# Patient Record
Sex: FEMALE | Race: WHITE | NOT HISPANIC OR LATINO | Employment: OTHER | URBAN - METROPOLITAN AREA
[De-identification: names, ages, dates, MRNs, and addresses within clinical notes are randomized per-mention and may not be internally consistent; named-entity substitution may affect disease eponyms.]

---

## 2018-01-08 ENCOUNTER — TRANSCRIBE ORDERS (OUTPATIENT)
Dept: ADMINISTRATIVE | Facility: HOSPITAL | Age: 63
End: 2018-01-08

## 2018-01-08 DIAGNOSIS — Z12.31 VISIT FOR SCREENING MAMMOGRAM: Primary | ICD-10-CM

## 2018-02-02 ENCOUNTER — APPOINTMENT (OUTPATIENT)
Dept: MAMMOGRAPHY | Facility: HOSPITAL | Age: 63
End: 2018-02-02
Attending: INTERNAL MEDICINE

## 2018-04-05 ENCOUNTER — OFFICE VISIT (OUTPATIENT)
Dept: INTERNAL MEDICINE | Facility: CLINIC | Age: 63
End: 2018-04-05

## 2018-04-05 VITALS
TEMPERATURE: 97 F | SYSTOLIC BLOOD PRESSURE: 138 MMHG | HEIGHT: 64 IN | RESPIRATION RATE: 16 BRPM | WEIGHT: 114 LBS | HEART RATE: 68 BPM | DIASTOLIC BLOOD PRESSURE: 62 MMHG | BODY MASS INDEX: 19.46 KG/M2

## 2018-04-05 DIAGNOSIS — F33.9 RECURRENT MAJOR DEPRESSIVE DISORDER, REMISSION STATUS UNSPECIFIED (HCC): ICD-10-CM

## 2018-04-05 DIAGNOSIS — G43.009 MIGRAINE WITHOUT AURA AND WITHOUT STATUS MIGRAINOSUS, NOT INTRACTABLE: ICD-10-CM

## 2018-04-05 DIAGNOSIS — I49.3 PREMATURE VENTRICULAR BEAT: ICD-10-CM

## 2018-04-05 DIAGNOSIS — R05.9 COUGH: ICD-10-CM

## 2018-04-05 DIAGNOSIS — I34.0 MITRAL VALVE INSUFFICIENCY, UNSPECIFIED ETIOLOGY: ICD-10-CM

## 2018-04-05 DIAGNOSIS — J06.9 ACUTE URI: Primary | ICD-10-CM

## 2018-04-05 DIAGNOSIS — Z13.220 SCREENING FOR LIPID DISORDERS: ICD-10-CM

## 2018-04-05 DIAGNOSIS — Z72.0 DECLINED SMOKING CESSATION: ICD-10-CM

## 2018-04-05 DIAGNOSIS — Z00.00 ENCOUNTER FOR HEALTH MAINTENANCE EXAMINATION: ICD-10-CM

## 2018-04-05 DIAGNOSIS — Z79.82 LONG TERM (CURRENT) USE OF ASPIRIN: ICD-10-CM

## 2018-04-05 LAB
ALBUMIN SERPL-MCNC: 4.4 G/DL (ref 3.2–4.8)
ALBUMIN/GLOB SERPL: 1.6 G/DL (ref 1.5–2.5)
ALP SERPL-CCNC: 91 U/L (ref 25–100)
ALT SERPL W P-5'-P-CCNC: 14 U/L (ref 7–40)
ANION GAP SERPL CALCULATED.3IONS-SCNC: 13 MMOL/L (ref 3–11)
ARTICHOKE IGE QN: 110 MG/DL (ref 0–130)
AST SERPL-CCNC: 20 U/L (ref 0–33)
BASOPHILS # BLD AUTO: 0.04 10*3/MM3 (ref 0–0.2)
BASOPHILS NFR BLD AUTO: 0.4 % (ref 0–1)
BILIRUB BLD-MCNC: NEGATIVE MG/DL
BILIRUB SERPL-MCNC: 0.5 MG/DL (ref 0.3–1.2)
BUN BLD-MCNC: 11 MG/DL (ref 9–23)
BUN/CREAT SERPL: 13.8 (ref 7–25)
CALCIUM SPEC-SCNC: 9.9 MG/DL (ref 8.7–10.4)
CHLORIDE SERPL-SCNC: 96 MMOL/L (ref 99–109)
CHOLEST SERPL-MCNC: 164 MG/DL (ref 0–200)
CLARITY, POC: CLEAR
CO2 SERPL-SCNC: 28 MMOL/L (ref 20–31)
COLOR UR: YELLOW
CREAT BLD-MCNC: 0.8 MG/DL (ref 0.6–1.3)
DEPRECATED RDW RBC AUTO: 46.9 FL (ref 37–54)
EOSINOPHIL # BLD AUTO: 0.06 10*3/MM3 (ref 0–0.3)
EOSINOPHIL NFR BLD AUTO: 0.6 % (ref 0–3)
ERYTHROCYTE [DISTWIDTH] IN BLOOD BY AUTOMATED COUNT: 13.5 % (ref 11.3–14.5)
EXPIRATION DATE: NORMAL
GFR SERPL CREATININE-BSD FRML MDRD: 73 ML/MIN/1.73
GLOBULIN UR ELPH-MCNC: 2.7 GM/DL
GLUCOSE BLD-MCNC: 89 MG/DL (ref 70–100)
GLUCOSE UR STRIP-MCNC: NEGATIVE MG/DL
HCT VFR BLD AUTO: 41.8 % (ref 34.5–44)
HDLC SERPL-MCNC: 53 MG/DL (ref 40–60)
HGB BLD-MCNC: 13.6 G/DL (ref 11.5–15.5)
IMM GRANULOCYTES # BLD: 0.06 10*3/MM3 (ref 0–0.03)
IMM GRANULOCYTES NFR BLD: 0.6 % (ref 0–0.6)
KETONES UR QL: NEGATIVE
LEUKOCYTE EST, POC: NEGATIVE
LYMPHOCYTES # BLD AUTO: 2.22 10*3/MM3 (ref 0.6–4.8)
LYMPHOCYTES NFR BLD AUTO: 23.3 % (ref 24–44)
Lab: NORMAL
MCH RBC QN AUTO: 30.7 PG (ref 27–31)
MCHC RBC AUTO-ENTMCNC: 32.5 G/DL (ref 32–36)
MCV RBC AUTO: 94.4 FL (ref 80–99)
MONOCYTES # BLD AUTO: 0.58 10*3/MM3 (ref 0–1)
MONOCYTES NFR BLD AUTO: 6.1 % (ref 0–12)
NEUTROPHILS # BLD AUTO: 6.58 10*3/MM3 (ref 1.5–8.3)
NEUTROPHILS NFR BLD AUTO: 69 % (ref 41–71)
NITRITE UR-MCNC: NEGATIVE MG/ML
PH UR: 6 [PH] (ref 5–8)
PLATELET # BLD AUTO: 516 10*3/MM3 (ref 150–450)
PMV BLD AUTO: 10.7 FL (ref 6–12)
POTASSIUM BLD-SCNC: 4.6 MMOL/L (ref 3.5–5.5)
PROT SERPL-MCNC: 7.1 G/DL (ref 5.7–8.2)
PROT UR STRIP-MCNC: NEGATIVE MG/DL
RBC # BLD AUTO: 4.43 10*6/MM3 (ref 3.89–5.14)
RBC # UR STRIP: NEGATIVE /UL
SODIUM BLD-SCNC: 137 MMOL/L (ref 132–146)
SP GR UR: 1.01 (ref 1–1.03)
TRIGL SERPL-MCNC: 73 MG/DL (ref 0–150)
UROBILINOGEN UR QL: NORMAL
WBC NRBC COR # BLD: 9.54 10*3/MM3 (ref 3.5–10.8)

## 2018-04-05 PROCEDURE — 93000 ELECTROCARDIOGRAM COMPLETE: CPT | Performed by: PHYSICIAN ASSISTANT

## 2018-04-05 PROCEDURE — 80061 LIPID PANEL: CPT | Performed by: PHYSICIAN ASSISTANT

## 2018-04-05 PROCEDURE — 99204 OFFICE O/P NEW MOD 45 MIN: CPT | Performed by: PHYSICIAN ASSISTANT

## 2018-04-05 PROCEDURE — 80053 COMPREHEN METABOLIC PANEL: CPT | Performed by: PHYSICIAN ASSISTANT

## 2018-04-05 PROCEDURE — 36415 COLL VENOUS BLD VENIPUNCTURE: CPT | Performed by: PHYSICIAN ASSISTANT

## 2018-04-05 PROCEDURE — 81003 URINALYSIS AUTO W/O SCOPE: CPT | Performed by: PHYSICIAN ASSISTANT

## 2018-04-05 PROCEDURE — 85025 COMPLETE CBC W/AUTO DIFF WBC: CPT | Performed by: PHYSICIAN ASSISTANT

## 2018-04-05 RX ORDER — CHOLECALCIFEROL (VITAMIN D3) 125 MCG
1 CAPSULE ORAL DAILY
COMMUNITY
End: 2021-11-29

## 2018-04-05 RX ORDER — ASPIRIN 81 MG/1
81 TABLET ORAL DAILY
COMMUNITY
End: 2019-04-05

## 2018-04-05 RX ORDER — CLOBETASOL PROPIONATE 0.5 MG/G
1 OINTMENT TOPICAL TAKE AS DIRECTED
COMMUNITY
End: 2018-10-05 | Stop reason: SDUPTHER

## 2018-04-05 RX ORDER — AZITHROMYCIN 250 MG/1
TABLET, FILM COATED ORAL
Qty: 6 TABLET | Refills: 0 | Status: SHIPPED | OUTPATIENT
Start: 2018-04-05 | End: 2018-10-05

## 2018-04-05 RX ORDER — BENZONATATE 100 MG/1
100 CAPSULE ORAL 3 TIMES DAILY PRN
Qty: 30 CAPSULE | Refills: 0 | Status: SHIPPED | OUTPATIENT
Start: 2018-04-05 | End: 2019-04-05

## 2018-04-05 NOTE — PROGRESS NOTES
Chief Complaint   Patient presents with   • NEW PATIENT, ESTABLISH CARE   • Cough       Subjective       History of Present Illness     Kim Watson is a 62 y.o. female. Cold symptoms x2 weeks with runny nose, non productive cough but coughs so much that she gags and feels like she might throw up. She describes this as a dry, irritating cough.  Also complains of low grade fever, highest at 101.0 a few days ago, + headache, nasal congestion sneezing,  ear pressure. Patient denies eye drainage, itching or redness, no sore throat or difficulty swallowing. Patient states that she also has a pain on the left side of her chest wrapping around to her back which is muscular in nature, and she believes she pulled a muscle due to coughing.  This happened several days ago and has been improving since that time. Has tried tylenol cold and flu, sunny seltzer, other OTC products including Delsym. Tylenol cold and flu provided some relief, but still coughing even with Delsym.   Current smoker-- less than 1/2 a day. Has cut back since she's been sick.  Is interested in smoking cessation but not at this visit despite smoking cessation counseling. Has quit in the past, longest time was one year with no cigarettes.    PMH:  Depression-- Patient states that her depression is fairly well-controlled.  She is tearful 1-2 days per week, but she explained that this was primarily due to life stressors, including caring for her mother who lives with her.  Patient denies anxiety, thoughts of self-harm, or suicidal ideations.  She has been on medications in the past for her depression, but this has been more than 10 years ago.  She is not interested in prescription management of her depression at this time.  Dr. Cristian Lou-- previously prescribed Topomax for headaches and Rx (unknown name) for depression. Not interested in psychiatry or medications at this time.     Migraines-- not currently taking Rx, sometimes takes Aleve. Has  migraines approx 1/month. S/Sx include difficulty focusing eyes, no photophobia or phonophobia, + nausea but no vomiting. Aleve doesn't seem to help, Excedrin doesn't seem to help. Does not interfere with daily life and not interested in other medications or therapies at this time.  Usually resolve spontaneously within 24-36 hours.    Patient also has mitral valve regurgitation, no problems, no concerns at this time. Is not currently seeing cardiology and not interested in cardiology referral at this time.     Are you currently seeing any other doctors or specialists? No     Are you currently taking any OTC medications or herbal medications? Vitamin D, Malik low dose aspirin 81mg, Clobetasol ointment    Current smoker at 1/2 ppd. 47 years. Smoked 2 ppd for 5 years, 1 ppd for 10 years, 1/2 pack since then. Approximately 35 pack year history.     Sleep: fair, 3-6 hours a night. Feels well rested  Diet: lots of H2O, well-balanced  Exercise: exercises daily, walking, several miles    Most recent colonoscopy: n/a-- FOC in the past (normal).   Most recent mammogram: Feb. 2018 (normal)   Most recent pap smear: 20+ years; partial hysterectomy, no cervix-- not indicated.   First day of last menses: n/a-- menopausal. No bleeding or spotting.   Low dose CT scan for screening for lung cancer: pt states that previous doctor tried to order this given her smoking history, the patients insurance denied this.  Patient states that this was at the beginning of 2018.  We will defer lung cancer screening with low-dose CT until next visit.     Regular dental visits: previously, last visit 2 years ago.   Regular eye exams: previously, last visit 2 years ago. Wears glasses.     Immunizations: Patient states that she believes she has had a pneumonia vaccination in the past, approximately 3-5 years ago.  Patient also states she has had another vaccination, possibly TD, in recent years.  Encouraged patient to try and findings records so we  could update immunizations in our system.    The following portions of the patient's history were reviewed and updated as appropriate: allergies, current medications, past family history, past medical history, past social history, past surgical history and problem list.    Allergies   Allergen Reactions   • Morphine And Related GI Intolerance     SEVERE   • Sulfa Antibiotics GI Intolerance     SEVERE     Social History   Substance Use Topics   • Smoking status: Current Every Day Smoker     Types: Cigarettes     Start date: 1974   • Smokeless tobacco: Not on file   • Alcohol use Not on file     History reviewed. No pertinent surgical history.  Family History   Problem Relation Age of Onset   • Arthritis Mother    • Migraines Mother    • Cancer Father      BLADDER   • Alzheimer's disease Father    • Diabetes Brother    • Migraines Brother          Current Outpatient Prescriptions:   •  aspirin 81 MG EC tablet, Take 81 mg by mouth Daily., Disp: , Rfl:   •  Cholecalciferol (VITAMIN D3) 2000 units tablet, Take 1 tablet by mouth Daily., Disp: , Rfl:   •  clobetasol (TEMOVATE) 0.05 % ointment, Apply 1 application topically Take As Directed., Disp: , Rfl:       Patient Active Problem List   Diagnosis   • Recurrent major depressive disorder   • Migraine without aura and without status migrainosus, not intractable       Review of Systems   Constitutional: Positive for chills, fatigue and fever. Negative for appetite change.   HENT: Positive for congestion, postnasal drip, sinus pressure and sneezing. Negative for ear pain, sore throat and trouble swallowing.    Eyes: Negative for pain, discharge and itching.   Respiratory: Positive for cough. Negative for chest tightness, shortness of breath and wheezing.    Cardiovascular: Negative for chest pain and palpitations.   Gastrointestinal: Negative for abdominal pain, diarrhea, nausea and vomiting.   Musculoskeletal: Negative for arthralgias and myalgias.   Skin: Negative for  rash.   Neurological: Positive for headaches. Negative for dizziness, syncope and confusion.       Objective   Vitals:    04/05/18 1024   BP: 138/62   Pulse: 68   Resp: 16   Temp: 97 °F (36.1 °C)       Physical Exam   Constitutional: She is oriented to person, place, and time. She appears well-developed and well-nourished.   HENT:   Head: Normocephalic and atraumatic.   Right Ear: Tympanic membrane normal. No tenderness. Tympanic membrane is not erythematous and not bulging.   Left Ear: Tympanic membrane normal. No tenderness. Tympanic membrane is not erythematous and not bulging.   Nose: Rhinorrhea and congestion present. Right sinus exhibits maxillary sinus tenderness. Right sinus exhibits no frontal sinus tenderness. Left sinus exhibits maxillary sinus tenderness. Left sinus exhibits no frontal sinus tenderness.   Eyes: Pupils are equal, round, and reactive to light.   Neck: Carotid bruit is not present. No thyroid mass and no thyromegaly present.   Cardiovascular: Normal rate.    premature beat noted before systole, possible PVCs    Pulmonary/Chest: Effort normal and breath sounds normal.   Abdominal: Soft. Bowel sounds are normal. There is no hepatosplenomegaly.   Neurological: She is alert and oriented to person, place, and time.   Psychiatric: Her behavior is normal. Her mood appears anxious. Cognition and memory are normal.       ECG 12 Lead  Date/Time: 4/5/2018 4:19 PM  Performed by: OSCAR ELLINGTON  Authorized by: OSCAR ELLINGTON   Previous ECG: no previous ECG available  Rhythm: sinus rhythm  Rate: normal  Conduction: conduction normal  ST Segments: ST segments normal  Other: no other findings                Assessment/Plan   Kim was seen today for new patient, establish care and cough.    Diagnoses and all orders for this visit:    Acute URI  -     azithromycin (ZITHROMAX Z-LUIS ANTONIO) 250 MG tablet; Take 2 tablets the first day, then 1 tablet daily for 4 days.    Recurrent major depressive disorder,  remission status unspecified    Screening for lipid disorders  -     Lipid Panel    Migraine without aura and without status migrainosus, not intractable  -     Comprehensive Metabolic Panel    Encounter for health maintenance examination  -     Ambulatory Referral For Screening Colonoscopy  -     Comprehensive Metabolic Panel  -     POC Urinalysis Dipstick, Automated  -     CBC & Differential  -     Lipid Panel  -     CBC Auto Differential    Long term (current) use of aspirin  -     CBC & Differential  -     CBC Auto Differential    Cough  -     azithromycin (ZITHROMAX Z-LUIS ANTONIO) 250 MG tablet; Take 2 tablets the first day, then 1 tablet daily for 4 days.  -     benzonatate (TESSALON PERLES) 100 MG capsule; Take 1 capsule by mouth 3 (Three) Times a Day As Needed for Cough.    Declined smoking cessation    Other orders  -     Cancel: CT Chest Low Dose Wo; Future  -     ECG 12 Lead      Immunizations: Encouraged patient to try to find immunization record for that we can update pneumonia, zoster and Tdap records.  Will hold off on vaccinations until next visit; consider pneumonia vaccine <65 years old due to long-term smoking/ high risk patient.       Patient education discussed during this visit:  - avoidance of texting while driving and the need for wearing seatbelt  - use of sunscreen  - healthy sleep habits and appropriate amount of sleep  - H2O consumption, well-balanced diet  - exercise routine which includes at least 150 minutes of cardio per week + muscle strengthening exercises  - immunizations including annual flu vaccination      Return in about 6 months (around 10/5/2018) for Annual physical.

## 2018-04-06 DIAGNOSIS — D75.839 THROMBOCYTOSIS: Primary | ICD-10-CM

## 2018-04-09 ENCOUNTER — TELEPHONE (OUTPATIENT)
Dept: INTERNAL MEDICINE | Facility: CLINIC | Age: 63
End: 2018-04-09

## 2018-04-09 NOTE — TELEPHONE ENCOUNTER
----- Message from Ariana Zepeda PA-C sent at 4/9/2018  9:28 AM EDT -----  Contact: SELF  Please update pt immunization record as indicated.       ----- Message -----  From: Edelmira Lr  Sent: 4/9/2018   9:13 AM  To: Ariana Zepeda PA-C    ROSALIND SARITHA CALLING TO UPDATE YOU ON HER PAST SHOTS. SHE HAS HAD HER TETANUS IN 2004, ZOSTER IN 2015 AND TDAP IN JAN OF 2018.    IF NEEDED ROSALIND CAN BE REACHED [phone]

## 2018-04-10 DIAGNOSIS — Z12.11 SCREENING FOR COLON CANCER: Primary | ICD-10-CM

## 2018-04-10 RX ORDER — SODIUM, POTASSIUM,MAG SULFATES 17.5-3.13G
1 SOLUTION, RECONSTITUTED, ORAL ORAL TAKE AS DIRECTED
Qty: 2 BOTTLE | Refills: 0 | Status: SHIPPED | OUTPATIENT
Start: 2018-04-10 | End: 2019-04-05

## 2018-04-17 ENCOUNTER — OUTSIDE FACILITY SERVICE (OUTPATIENT)
Dept: GASTROENTEROLOGY | Facility: CLINIC | Age: 63
End: 2018-04-17

## 2018-04-17 PROCEDURE — G0121 COLON CA SCRN NOT HI RSK IND: HCPCS | Performed by: INTERNAL MEDICINE

## 2018-04-26 ENCOUNTER — LAB (OUTPATIENT)
Dept: INTERNAL MEDICINE | Facility: CLINIC | Age: 63
End: 2018-04-26

## 2018-04-26 DIAGNOSIS — D75.839 THROMBOCYTOSIS: ICD-10-CM

## 2018-04-26 LAB
BASOPHILS # BLD AUTO: 0.05 10*3/MM3 (ref 0–0.2)
BASOPHILS NFR BLD AUTO: 0.8 % (ref 0–1)
DEPRECATED RDW RBC AUTO: 48 FL (ref 37–54)
EOSINOPHIL # BLD AUTO: 0.13 10*3/MM3 (ref 0–0.3)
EOSINOPHIL NFR BLD AUTO: 2.1 % (ref 0–3)
ERYTHROCYTE [DISTWIDTH] IN BLOOD BY AUTOMATED COUNT: 13.9 % (ref 11.3–14.5)
HCT VFR BLD AUTO: 42 % (ref 34.5–44)
HGB BLD-MCNC: 13.5 G/DL (ref 11.5–15.5)
IMM GRANULOCYTES # BLD: 0.02 10*3/MM3 (ref 0–0.03)
IMM GRANULOCYTES NFR BLD: 0.3 % (ref 0–0.6)
LYMPHOCYTES # BLD AUTO: 2.22 10*3/MM3 (ref 0.6–4.8)
LYMPHOCYTES NFR BLD AUTO: 35.3 % (ref 24–44)
MCH RBC QN AUTO: 30.1 PG (ref 27–31)
MCHC RBC AUTO-ENTMCNC: 32.1 G/DL (ref 32–36)
MCV RBC AUTO: 93.5 FL (ref 80–99)
MONOCYTES # BLD AUTO: 0.53 10*3/MM3 (ref 0–1)
MONOCYTES NFR BLD AUTO: 8.4 % (ref 0–12)
NEUTROPHILS # BLD AUTO: 3.36 10*3/MM3 (ref 1.5–8.3)
NEUTROPHILS NFR BLD AUTO: 53.4 % (ref 41–71)
PLATELET # BLD AUTO: 285 10*3/MM3 (ref 150–450)
PMV BLD AUTO: 11.6 FL (ref 6–12)
RBC # BLD AUTO: 4.49 10*6/MM3 (ref 3.89–5.14)
WBC NRBC COR # BLD: 6.29 10*3/MM3 (ref 3.5–10.8)

## 2018-04-26 PROCEDURE — 85025 COMPLETE CBC W/AUTO DIFF WBC: CPT | Performed by: PHYSICIAN ASSISTANT

## 2018-04-26 PROCEDURE — 36415 COLL VENOUS BLD VENIPUNCTURE: CPT | Performed by: PHYSICIAN ASSISTANT

## 2018-10-04 NOTE — PROGRESS NOTES
Chief Complaint   Patient presents with   • Annual Exam     Physical w/o pap (since the 90's)   • Immunizations     Hep A vaccine       Subjective       History of Present Illness     Kim Watson is a 62 y.o. female. She presents for annual physical. Overall pt states she is doing very well. She has no new concerns since last visit. She does report a mild headache since yesterday, but she began wearing new prescription glasses yesterday and believes this is causing headache, as she had similar symptoms with last changes in glasses Rx which resolved within a few days. Pt is exercising regularly and healthy diet. She is still smoking 0.5 ppd but trying to quit on her own, does not want want patches, gum, Chantix or other aids at this time. Pt does report some trouble sleeping, but this is unchanged for several years. She has some family stress in her life but tolerable. She denies any anxiety, thoughts of self-harm, feeling hopeless.       Are you currently seeing any other doctors or specialists? No     Are you currently taking any OTC medications or herbal medications? Vitamin D, advil PRN    Sleep: 5-6 hours/ night, fairly well rested  Diet: lots of H2O, well-balanced  Exercise: exercises daily, walking, several miles most days. 30,000+ steps most days.      Most recent colonoscopy: 4/17/2018- normal, in [facility], repeat in 10 years  Most recent mammogram: Feb. 2018 (normal, per pt)   Most recent pap smear: n/a- partial hysterectomy + cervix  First day of last menses: n/a-- menopausal. No bleeding or spotting     Regular dental visits: No   Regular eye exams: Yes, most recent visit yesterday, new glasses. Wears glasses, no contacts.    Current smoker at 1/2 ppd. 45 years. Smoked 2 ppd for 5 years, 1 ppd for 10 years, 1/2 pack since then. Approximately 35 pack year history.       PHQ-9 Depression Screening  Little interest or pleasure in doing things? 1   Feeling down, depressed, or hopeless? 1   Trouble falling  or staying asleep, or sleeping too much? 3   Feeling tired or having little energy? 0   Poor appetite or overeating? 0   Feeling bad about yourself - or that you are a failure or have let yourself or your family down? 0   Trouble concentrating on things, such as reading the newspaper or watching television? 0   Moving or speaking so slowly that other people could have noticed? Or the opposite - being so fidgety or restless that you have been moving around a lot more than usual? 0   Thoughts that you would be better off dead, or of hurting yourself in some way? 0   PHQ-9 Total Score 5   If you checked off any problems, how difficult have these problems made it for you to do your work, take care of things at home, or get along with other people? Somewhat difficult         The following portions of the patient's history were reviewed and updated as appropriate: allergies, current medications, past medical history, past social history, past surgical history and problem list.    Allergies   Allergen Reactions   • Morphine And Related GI Intolerance     SEVERE   • Sulfa Antibiotics GI Intolerance     SEVERE     Social History   Substance Use Topics   • Smoking status: Current Every Day Smoker     Types: Cigarettes     Start date: 1974   • Smokeless tobacco: Not on file   • Alcohol use Not on file     History reviewed. No pertinent surgical history.  Family History   Problem Relation Age of Onset   • Arthritis Mother    • Migraines Mother    • Cancer Father         BLADDER   • Alzheimer's disease Father    • Diabetes Brother    • Migraines Brother          Current Outpatient Prescriptions:   •  aspirin 81 MG EC tablet, Take 81 mg by mouth Daily., Disp: , Rfl:   •  Cholecalciferol (VITAMIN D3) 2000 units tablet, Take 1 tablet by mouth Daily., Disp: , Rfl:   •  clobetasol (TEMOVATE) 0.05 % ointment, Apply 1 application topically to the appropriate area as directed Take As Directed., Disp: 45 g, Rfl: 1  •  benzonatate  (TESSALON PERLES) 100 MG capsule, Take 1 capsule by mouth 3 (Three) Times a Day As Needed for Cough., Disp: 30 capsule, Rfl: 0  •  sodium-potassium-magnesium sulfates (SUPREP BOWEL PREP KIT) 17.5-3.13-1.6 GM/180ML solution oral solution, Take 1 bottle by mouth Take As Directed. Follow instructions that were mailed to your home. If you didn't receive these call [phone]., Disp: 2 bottle, Rfl: 0    Patient Active Problem List   Diagnosis   • Recurrent major depressive disorder (CMS/HCC)   • Migraine without aura and without status migrainosus, not intractable   • Tobacco abuse       Review of Systems   Constitutional: Negative for chills, fatigue, fever, unexpected weight gain and unexpected weight loss.   HENT: Negative for congestion, ear pain, postnasal drip, sore throat and trouble swallowing.    Eyes: Negative for pain, redness and visual disturbance.   Respiratory: Negative for cough, chest tightness, shortness of breath and wheezing.    Cardiovascular: Negative for chest pain, palpitations and leg swelling.   Gastrointestinal: Negative for abdominal pain, blood in stool, diarrhea, nausea and vomiting.   Endocrine: Negative for cold intolerance and heat intolerance.   Genitourinary: Negative for breast discharge, urinary incontinence, difficulty urinating, dysuria, frequency, pelvic pain and breast lump.   Musculoskeletal: Negative for arthralgias, gait problem and myalgias.   Skin: Negative for rash.   Neurological: Positive for headache. Negative for dizziness, syncope, weakness and numbness.   Hematological: Does not bruise/bleed easily.   Psychiatric/Behavioral: Positive for stress. Negative for agitation, self-injury and depressed mood. The patient is not nervous/anxious.        Objective   Vitals:    10/05/18 0855   BP: 128/74   Pulse: 61   Resp: 16   Temp: 97.3 °F (36.3 °C)   SpO2: 98%     Physical Exam   Constitutional: She is oriented to person, place, and time. She appears well-developed and  well-nourished.   HENT:   Head: Normocephalic and atraumatic. Head is without abrasion and without contusion.   Right Ear: External ear normal. No tenderness. No foreign bodies. Tympanic membrane is not perforated and not erythematous.   Left Ear: External ear normal. No tenderness. No foreign bodies. Tympanic membrane is not perforated and not erythematous.   Nose: Nose normal. No mucosal edema or sinus tenderness. No epistaxis. Right sinus exhibits no maxillary sinus tenderness and no frontal sinus tenderness. Left sinus exhibits no maxillary sinus tenderness and no frontal sinus tenderness.   Mouth/Throat: Uvula is midline and oropharynx is clear and moist. No oral lesions.   Eyes: Pupils are equal, round, and reactive to light. Conjunctivae and EOM are normal. No scleral icterus.   Neck: Trachea normal and normal range of motion. Neck supple. Carotid bruit is not present. No thyroid mass and no thyromegaly present.   Cardiovascular: Normal rate, regular rhythm, normal heart sounds and normal pulses.  Exam reveals no gallop.    No murmur heard.  Pulses:       Radial pulses are 2+ on the right side, and 2+ on the left side.        Dorsalis pedis pulses are 2+ on the right side, and 2+ on the left side.   Pulmonary/Chest: Effort normal and breath sounds normal. She has no wheezes. She has no rales. She exhibits no tenderness and no deformity. Right breast exhibits no mass. Left breast exhibits no mass.   Abdominal: Soft. Bowel sounds are normal. She exhibits no mass. There is no hepatosplenomegaly. There is no tenderness. There is no guarding.   Musculoskeletal: Normal range of motion. She exhibits no edema or deformity.   Lymphadenopathy:     She has no cervical adenopathy.     She has no axillary adenopathy.   Neurological: She is alert and oriented to person, place, and time. She has normal strength.   Skin: Skin is warm and dry. No rash noted.   Psychiatric: She has a normal mood and affect. Her behavior is  normal.   Vitals reviewed.    Normal physical exam.        Assessment/Plan   Kim was seen today for annual exam and immunizations.    Diagnoses and all orders for this visit:    Encounter for health maintenance examination  -     CBC & Differential  -     Comprehensive Metabolic Panel  -     Lipid Panel  -     TSH  -     POC Urinalysis Dipstick, Automated  -     Hepatitis C Antibody  -     CBC Auto Differential    Need for influenza vaccination  -     Fluarix/Fluzone/Afluria Quad>6 Months    Encounter for hepatitis C screening test for low risk patient  -     Hepatitis C Antibody    Tobacco abuse    Need for pneumococcal vaccination  -     Pneumococcal Polysaccharide Vaccine 23-Valent (PPSV23) Greater Than or Equal To 3yo Subcutaneous / IM    Other orders  -     clobetasol (TEMOVATE) 0.05 % ointment; Apply 1 application topically to the appropriate area as directed Take As Directed.      Continue all current meds.   Discussed smoking cessation with patient for approximately 7 minutes, including health risks of tobacco abuse. Also discussed options for smoking cessation including nicotine patches/ gum, Wellbutrin, and Chantix. Patient not interested in smoking cessation aids at this time. She plans to decrease cig use on her own and is considering vaping. Highly encouraged to use step-down method or vaping for reduction and eventual smoking cessation.              Patient education discussed during this visit:  - avoidance of texting while driving and the need for wearing seatbelt  - use of sunscreen  - healthy sleep habits and appropriate amount of sleep  - H2O consumption, well-balanced diet  - exercise routine which includes at least 150 minutes of cardio per week + muscle strengthening exercises  - immunizations including annual flu vaccination      Return in about 6 months (around 4/5/2019) for Follow up.

## 2018-10-05 ENCOUNTER — OFFICE VISIT (OUTPATIENT)
Dept: INTERNAL MEDICINE | Facility: CLINIC | Age: 63
End: 2018-10-05

## 2018-10-05 VITALS
SYSTOLIC BLOOD PRESSURE: 128 MMHG | HEART RATE: 61 BPM | DIASTOLIC BLOOD PRESSURE: 74 MMHG | RESPIRATION RATE: 16 BRPM | OXYGEN SATURATION: 98 % | BODY MASS INDEX: 19.6 KG/M2 | HEIGHT: 64 IN | WEIGHT: 114.8 LBS | TEMPERATURE: 97.3 F

## 2018-10-05 DIAGNOSIS — Z00.00 ENCOUNTER FOR HEALTH MAINTENANCE EXAMINATION: Primary | ICD-10-CM

## 2018-10-05 DIAGNOSIS — Z23 NEED FOR INFLUENZA VACCINATION: ICD-10-CM

## 2018-10-05 DIAGNOSIS — Z23 NEED FOR PNEUMOCOCCAL VACCINATION: ICD-10-CM

## 2018-10-05 DIAGNOSIS — Z72.0 TOBACCO ABUSE: ICD-10-CM

## 2018-10-05 DIAGNOSIS — Z11.59 ENCOUNTER FOR HEPATITIS C SCREENING TEST FOR LOW RISK PATIENT: ICD-10-CM

## 2018-10-05 LAB
ALBUMIN SERPL-MCNC: 4.38 G/DL (ref 3.2–4.8)
ALBUMIN/GLOB SERPL: 2.1 G/DL (ref 1.5–2.5)
ALP SERPL-CCNC: 73 U/L (ref 25–100)
ALT SERPL W P-5'-P-CCNC: 15 U/L (ref 7–40)
ANION GAP SERPL CALCULATED.3IONS-SCNC: 4 MMOL/L (ref 3–11)
ARTICHOKE IGE QN: 121 MG/DL (ref 0–130)
AST SERPL-CCNC: 27 U/L (ref 0–33)
BASOPHILS # BLD AUTO: 0.05 10*3/MM3 (ref 0–0.2)
BASOPHILS NFR BLD AUTO: 0.9 % (ref 0–1)
BILIRUB BLD-MCNC: NEGATIVE MG/DL
BILIRUB SERPL-MCNC: 0.7 MG/DL (ref 0.3–1.2)
BUN BLD-MCNC: 15 MG/DL (ref 9–23)
BUN/CREAT SERPL: 18.5 (ref 7–25)
CALCIUM SPEC-SCNC: 9.4 MG/DL (ref 8.7–10.4)
CHLORIDE SERPL-SCNC: 108 MMOL/L (ref 99–109)
CHOLEST SERPL-MCNC: 190 MG/DL (ref 0–200)
CLARITY, POC: CLEAR
CO2 SERPL-SCNC: 26 MMOL/L (ref 20–31)
COLOR UR: YELLOW
CREAT BLD-MCNC: 0.81 MG/DL (ref 0.6–1.3)
DEPRECATED RDW RBC AUTO: 47.5 FL (ref 37–54)
EOSINOPHIL # BLD AUTO: 0.1 10*3/MM3 (ref 0–0.3)
EOSINOPHIL NFR BLD AUTO: 1.9 % (ref 0–3)
ERYTHROCYTE [DISTWIDTH] IN BLOOD BY AUTOMATED COUNT: 13.7 % (ref 11.3–14.5)
EXPIRATION DATE: NORMAL
GFR SERPL CREATININE-BSD FRML MDRD: 72 ML/MIN/1.73
GLOBULIN UR ELPH-MCNC: 2.1 GM/DL
GLUCOSE BLD-MCNC: 83 MG/DL (ref 70–100)
GLUCOSE UR STRIP-MCNC: NEGATIVE MG/DL
HCT VFR BLD AUTO: 44 % (ref 34.5–44)
HCV AB SER DONR QL: NORMAL
HDLC SERPL-MCNC: 66 MG/DL (ref 40–60)
HGB BLD-MCNC: 14.2 G/DL (ref 11.5–15.5)
IMM GRANULOCYTES # BLD: 0.02 10*3/MM3 (ref 0–0.03)
IMM GRANULOCYTES NFR BLD: 0.4 % (ref 0–0.6)
KETONES UR QL: NEGATIVE
LEUKOCYTE EST, POC: NEGATIVE
LYMPHOCYTES # BLD AUTO: 1.7 10*3/MM3 (ref 0.6–4.8)
LYMPHOCYTES NFR BLD AUTO: 31.5 % (ref 24–44)
Lab: NORMAL
MCH RBC QN AUTO: 30.5 PG (ref 27–31)
MCHC RBC AUTO-ENTMCNC: 32.3 G/DL (ref 32–36)
MCV RBC AUTO: 94.6 FL (ref 80–99)
MONOCYTES # BLD AUTO: 0.46 10*3/MM3 (ref 0–1)
MONOCYTES NFR BLD AUTO: 8.5 % (ref 0–12)
NEUTROPHILS # BLD AUTO: 3.06 10*3/MM3 (ref 1.5–8.3)
NEUTROPHILS NFR BLD AUTO: 56.8 % (ref 41–71)
NITRITE UR-MCNC: NEGATIVE MG/ML
PH UR: 5 [PH] (ref 5–8)
PLATELET # BLD AUTO: 302 10*3/MM3 (ref 150–450)
PMV BLD AUTO: 11 FL (ref 6–12)
POTASSIUM BLD-SCNC: 4 MMOL/L (ref 3.5–5.5)
PROT SERPL-MCNC: 6.5 G/DL (ref 5.7–8.2)
PROT UR STRIP-MCNC: NEGATIVE MG/DL
RBC # BLD AUTO: 4.65 10*6/MM3 (ref 3.89–5.14)
RBC # UR STRIP: NEGATIVE /UL
SODIUM BLD-SCNC: 138 MMOL/L (ref 132–146)
SP GR UR: 1.02 (ref 1–1.03)
TRIGL SERPL-MCNC: 69 MG/DL (ref 0–150)
TSH SERPL DL<=0.05 MIU/L-ACNC: 1.49 MIU/ML (ref 0.35–5.35)
UROBILINOGEN UR QL: NORMAL
WBC NRBC COR # BLD: 5.39 10*3/MM3 (ref 3.5–10.8)

## 2018-10-05 PROCEDURE — 80053 COMPREHEN METABOLIC PANEL: CPT | Performed by: PHYSICIAN ASSISTANT

## 2018-10-05 PROCEDURE — 85025 COMPLETE CBC W/AUTO DIFF WBC: CPT | Performed by: PHYSICIAN ASSISTANT

## 2018-10-05 PROCEDURE — 99396 PREV VISIT EST AGE 40-64: CPT | Performed by: PHYSICIAN ASSISTANT

## 2018-10-05 PROCEDURE — 81003 URINALYSIS AUTO W/O SCOPE: CPT | Performed by: PHYSICIAN ASSISTANT

## 2018-10-05 PROCEDURE — 90686 IIV4 VACC NO PRSV 0.5 ML IM: CPT | Performed by: PHYSICIAN ASSISTANT

## 2018-10-05 PROCEDURE — 36415 COLL VENOUS BLD VENIPUNCTURE: CPT | Performed by: PHYSICIAN ASSISTANT

## 2018-10-05 PROCEDURE — 99406 BEHAV CHNG SMOKING 3-10 MIN: CPT | Performed by: PHYSICIAN ASSISTANT

## 2018-10-05 PROCEDURE — 90471 IMMUNIZATION ADMIN: CPT | Performed by: PHYSICIAN ASSISTANT

## 2018-10-05 PROCEDURE — 90732 PPSV23 VACC 2 YRS+ SUBQ/IM: CPT | Performed by: PHYSICIAN ASSISTANT

## 2018-10-05 PROCEDURE — 90472 IMMUNIZATION ADMIN EACH ADD: CPT | Performed by: PHYSICIAN ASSISTANT

## 2018-10-05 PROCEDURE — 86803 HEPATITIS C AB TEST: CPT | Performed by: PHYSICIAN ASSISTANT

## 2018-10-05 PROCEDURE — 80061 LIPID PANEL: CPT | Performed by: PHYSICIAN ASSISTANT

## 2018-10-05 PROCEDURE — 84443 ASSAY THYROID STIM HORMONE: CPT | Performed by: PHYSICIAN ASSISTANT

## 2018-10-05 RX ORDER — CLOBETASOL PROPIONATE 0.5 MG/G
1 OINTMENT TOPICAL TAKE AS DIRECTED
Qty: 45 G | Refills: 1 | Status: SHIPPED | OUTPATIENT
Start: 2018-10-05 | End: 2020-11-20

## 2019-04-05 ENCOUNTER — OFFICE VISIT (OUTPATIENT)
Dept: INTERNAL MEDICINE | Facility: CLINIC | Age: 64
End: 2019-04-05

## 2019-04-05 VITALS
TEMPERATURE: 97 F | BODY MASS INDEX: 21.68 KG/M2 | OXYGEN SATURATION: 99 % | WEIGHT: 127 LBS | SYSTOLIC BLOOD PRESSURE: 122 MMHG | HEART RATE: 62 BPM | RESPIRATION RATE: 16 BRPM | DIASTOLIC BLOOD PRESSURE: 80 MMHG | HEIGHT: 64 IN

## 2019-04-05 DIAGNOSIS — Z87.891 HISTORY OF TOBACCO ABUSE: ICD-10-CM

## 2019-04-05 DIAGNOSIS — F41.9 ANXIETY: Primary | ICD-10-CM

## 2019-04-05 DIAGNOSIS — G43.009 MIGRAINE WITHOUT AURA AND WITHOUT STATUS MIGRAINOSUS, NOT INTRACTABLE: ICD-10-CM

## 2019-04-05 PROBLEM — Z72.0 TOBACCO ABUSE: Status: RESOLVED | Noted: 2018-10-05 | Resolved: 2019-04-05

## 2019-04-05 PROCEDURE — 99213 OFFICE O/P EST LOW 20 MIN: CPT | Performed by: PHYSICIAN ASSISTANT

## 2019-04-05 RX ORDER — BUSPIRONE HYDROCHLORIDE 10 MG/1
10 TABLET ORAL 2 TIMES DAILY
Qty: 60 TABLET | Refills: 1 | Status: SHIPPED | OUTPATIENT
Start: 2019-04-05 | End: 2019-05-14

## 2019-04-05 NOTE — PROGRESS NOTES
"Chief Complaint   Patient presents with   • Migraine     6 month F/U   • Anxiety     6 month F/U       Subjective       History of Present Illness     Kim Watson is a 63 y.o. female. She presents for follow up of migraines and anxiety. Pt states she continue to have migraines on occasion, about 1-2/month. Stress is her major trigger, and she continues to have significant stress as noted below. She takes Aleve which does relieve her migraine. She denies aura symptoms. She is not interested in daily med for migraines at this time as Aleve improves her infrequent migraines.     Pt reports her anxiety has worsened. She has occasional low moods, but anxiety is her major concern today. Pt states her mother is ill and living with her, and this has worsened her anxiety. Pt states she is \"high strung\" at baseline, and now feels very frustrated, easily irritated d/t her mom's health issues and having to take care of her mom. Pt has daily anxiety but no anxiety attacks. She says her daughter is concerned about her and thinks she needs meds. She doesn't sleep well. Pt has hx anxiety and at age 16 was put on valium TID, d/c this in her 20s. Has also been on zoloft in the past with no relief of symptoms. Has not taken anxiety/ depression med in years. She is not seeing a counselor or therapist.     Pt stopped smoking cigarettes 10/15/2018. She is vaping, nicotine level 1.8 (lowest dose) and hopes to stop vaping in the near future as well.       The following portions of the patient's history were reviewed and updated as appropriate: allergies, current medications, past medical history, past social history, past surgical history and problem list.    Allergies   Allergen Reactions   • Morphine And Related GI Intolerance     SEVERE   • Sulfa Antibiotics GI Intolerance     SEVERE     Social History     Tobacco Use   • Smoking status: Former Smoker     Types: Cigarettes     Start date: 1974     Last attempt to quit: 10/15/2018 "     Years since quittin.4   • Smokeless tobacco: Never Used   Substance Use Topics   • Alcohol use: Yes         Current Outpatient Medications:   •  Cholecalciferol (VITAMIN D3) 2000 units tablet, Take 1 tablet by mouth Daily., Disp: , Rfl:   •  busPIRone (BUSPAR) 10 MG tablet, Take 1 tablet by mouth 2 (Two) Times a Day., Disp: 60 tablet, Rfl: 1  •  clobetasol (TEMOVATE) 0.05 % ointment, Apply 1 application topically to the appropriate area as directed Take As Directed., Disp: 45 g, Rfl: 1    Review of Systems   Constitutional: Negative for chills, fatigue and fever.   HENT: Negative for congestion, ear pain, sore throat and trouble swallowing.    Respiratory: Negative for cough and shortness of breath.    Cardiovascular: Negative for chest pain.   Gastrointestinal: Negative for abdominal pain, diarrhea, nausea and vomiting.   Genitourinary: Negative for dysuria.   Allergic/Immunologic: Negative for immunocompromised state.   Neurological: Positive for headache (occ migraines). Negative for dizziness.   Psychiatric/Behavioral: Positive for sleep disturbance and stress. Negative for depressed mood. The patient is nervous/anxious.        Objective   Vitals:    19 0821   BP: 122/80   Pulse: 62   Resp: 16   Temp: 97 °F (36.1 °C)   SpO2: 99%     Physical Exam   Constitutional: She appears well-developed and well-nourished.   HENT:   Head: Normocephalic and atraumatic.   Right Ear: Tympanic membrane, external ear and ear canal normal.   Left Ear: Tympanic membrane, external ear and ear canal normal.   Mouth/Throat: Oropharynx is clear and moist and mucous membranes are normal.   Eyes: Conjunctivae are normal.   Neck: Normal range of motion. Neck supple.   Cardiovascular: Normal rate and regular rhythm.   No murmur heard.  Pulmonary/Chest: Effort normal and breath sounds normal. She has no wheezes. She has no rales.   Lymphadenopathy:     She has no cervical adenopathy.   Psychiatric: Her behavior is normal. Her  mood appears anxious.   +fidgeting, unable to sit still during visit             Assessment/Plan   Kim was seen today for migraine and anxiety.    Diagnoses and all orders for this visit:    Anxiety  -     busPIRone (BUSPAR) 10 MG tablet; Take 1 tablet by mouth 2 (Two) Times a Day.    Migraine without aura and without status migrainosus, not intractable    History of tobacco abuse      Will trial Buspar at this time, as pt is quite anxious during entire exam, and has long hx anxiety.  Pt does not want to pursue counseling at this time as she is very busy taking care of her mother.          Return in about 6 weeks (around 5/17/2019) for Follow up- anxiety.

## 2019-05-14 ENCOUNTER — OFFICE VISIT (OUTPATIENT)
Dept: INTERNAL MEDICINE | Facility: CLINIC | Age: 64
End: 2019-05-14

## 2019-05-14 VITALS
SYSTOLIC BLOOD PRESSURE: 122 MMHG | HEIGHT: 64 IN | WEIGHT: 130 LBS | BODY MASS INDEX: 22.2 KG/M2 | TEMPERATURE: 97.9 F | HEART RATE: 56 BPM | RESPIRATION RATE: 16 BRPM | OXYGEN SATURATION: 98 % | DIASTOLIC BLOOD PRESSURE: 72 MMHG

## 2019-05-14 DIAGNOSIS — F41.9 ANXIETY: Primary | ICD-10-CM

## 2019-05-14 PROCEDURE — 99213 OFFICE O/P EST LOW 20 MIN: CPT | Performed by: PHYSICIAN ASSISTANT

## 2019-05-14 NOTE — PROGRESS NOTES
"Chief Complaint   Patient presents with   • Anxiety     6 week follow up        Subjective       History of Present Illness     Kim Watson is a 63 y.o. female. She presents for follow up of anxiety. Pt did trial Buspar x2 weeks, but this medication made her more tearful and she was crying almost daily on this med. She stopped taking this medication 3+ weeks ago and feels that she is back to her baseline. She still has daily anxiety, but no panic attacks. She denies low moods, although she does report wanting \"just to run away\" sometimes due to the stress in her life, including her mother's health and daughter's health. She does not have any thoughts of self-harm, just wants a break from life stressors. She is unable to leave her mother without care, and does not have anyone else to care for her more than a few hours at a time.   Pt has had therapy/ counseling in the past, and had a poor experience. She does not want to resume counseling or therapy at this time. She does not want any further anxiety medication, and primarily tried meds as her daughter was worried about her. Pt feels she is stable at her baseline as she has had anxiety since teenage years and has always been able to cope on her own. Had xanax in the past, but does not want benzos. Has tried zoloft in the past and did not improve symptoms.       The following portions of the patient's history were reviewed and updated as appropriate: allergies, current medications, past medical history, past social history and problem list.    Allergies   Allergen Reactions   • Morphine And Related GI Intolerance     SEVERE   • Sulfa Antibiotics GI Intolerance     SEVERE     Social History     Tobacco Use   • Smoking status: Former Smoker     Types: Cigarettes     Start date:      Last attempt to quit: 10/15/2018     Years since quittin.5   • Smokeless tobacco: Never Used   Substance Use Topics   • Alcohol use: Yes         Current Outpatient Medications: "   •  Cholecalciferol (VITAMIN D3) 2000 units tablet, Take 1 tablet by mouth Daily., Disp: , Rfl:   •  clobetasol (TEMOVATE) 0.05 % ointment, Apply 1 application topically to the appropriate area as directed Take As Directed., Disp: 45 g, Rfl: 1    Review of Systems   Constitutional: Negative for chills, fatigue and fever.   HENT: Negative for ear pain and sore throat.    Respiratory: Negative for cough and shortness of breath.    Cardiovascular: Negative for chest pain.   Gastrointestinal: Negative for abdominal pain, diarrhea, nausea and vomiting.   Neurological: Negative for dizziness and headache.   Psychiatric/Behavioral: Positive for stress. Negative for self-injury, suicidal ideas and depressed mood. The patient is nervous/anxious.        Objective   Vitals:    05/14/19 1202   BP: 122/72   Pulse: 56   Resp: 16   Temp: 97.9 °F (36.6 °C)   SpO2: 98%     Physical Exam   Constitutional: She appears well-developed and well-nourished.   HENT:   Mouth/Throat: Oropharynx is clear and moist.   Eyes: Conjunctivae are normal.   Cardiovascular: Normal rate, regular rhythm and normal heart sounds.   No murmur heard.  Pulmonary/Chest: Effort normal and breath sounds normal. She has no wheezes. She has no rales.   Psychiatric: Her speech is normal and behavior is normal. Judgment normal. Her mood appears anxious. She does not exhibit a depressed mood.         Assessment/Plan   Kim was seen today for anxiety.    Diagnoses and all orders for this visit:    Anxiety    - Pt declined therapy/ counseling. Pt declined further medication at this time.    Advised to RTC if anxiety worsens or interrupts daily life and relationships. If any new low moods, RTC for further eval and discussion. Pt in agreement with plan.            Return in about 6 months (around 11/14/2019) for Annual physical.

## 2019-11-04 ENCOUNTER — OFFICE VISIT (OUTPATIENT)
Dept: INTERNAL MEDICINE | Facility: CLINIC | Age: 64
End: 2019-11-04

## 2019-11-04 VITALS
OXYGEN SATURATION: 97 % | HEIGHT: 64 IN | SYSTOLIC BLOOD PRESSURE: 120 MMHG | BODY MASS INDEX: 22.36 KG/M2 | DIASTOLIC BLOOD PRESSURE: 80 MMHG | WEIGHT: 131 LBS | TEMPERATURE: 97.7 F | HEART RATE: 62 BPM | RESPIRATION RATE: 15 BRPM

## 2019-11-04 DIAGNOSIS — Z86.39 HISTORY OF VITAMIN D DEFICIENCY: ICD-10-CM

## 2019-11-04 DIAGNOSIS — Z23 NEED FOR INFLUENZA VACCINATION: ICD-10-CM

## 2019-11-04 DIAGNOSIS — Z12.39 BREAST CANCER SCREENING: ICD-10-CM

## 2019-11-04 DIAGNOSIS — F41.9 ANXIETY: ICD-10-CM

## 2019-11-04 DIAGNOSIS — M19.042 ARTHRITIS OF LEFT HAND: ICD-10-CM

## 2019-11-04 DIAGNOSIS — Z13.220 SCREENING FOR LIPID DISORDERS: ICD-10-CM

## 2019-11-04 DIAGNOSIS — Z00.00 ENCOUNTER FOR HEALTH MAINTENANCE EXAMINATION: Primary | ICD-10-CM

## 2019-11-04 DIAGNOSIS — K21.9 GASTROESOPHAGEAL REFLUX DISEASE, ESOPHAGITIS PRESENCE NOT SPECIFIED: ICD-10-CM

## 2019-11-04 LAB
25(OH)D3 SERPL-MCNC: 67.5 NG/ML (ref 30–100)
ALBUMIN SERPL-MCNC: 4.5 G/DL (ref 3.5–5.2)
ALBUMIN/GLOB SERPL: 1.4 G/DL
ALP SERPL-CCNC: 78 U/L (ref 39–117)
ALT SERPL W P-5'-P-CCNC: 11 U/L (ref 1–33)
ANION GAP SERPL CALCULATED.3IONS-SCNC: 8.9 MMOL/L (ref 5–15)
AST SERPL-CCNC: 20 U/L (ref 1–32)
B-HCG UR QL: NEGATIVE
BASOPHILS # BLD AUTO: 0.07 10*3/MM3 (ref 0–0.2)
BASOPHILS NFR BLD AUTO: 1.2 % (ref 0–1.5)
BILIRUB SERPL-MCNC: 0.3 MG/DL (ref 0.2–1.2)
BUN BLD-MCNC: 14 MG/DL (ref 8–23)
BUN/CREAT SERPL: 16.5 (ref 7–25)
CALCIUM SPEC-SCNC: 9.8 MG/DL (ref 8.6–10.5)
CHLORIDE SERPL-SCNC: 105 MMOL/L (ref 98–107)
CHOLEST SERPL-MCNC: 208 MG/DL (ref 0–200)
CO2 SERPL-SCNC: 29.1 MMOL/L (ref 22–29)
CREAT BLD-MCNC: 0.85 MG/DL (ref 0.57–1)
DEPRECATED RDW RBC AUTO: 44.5 FL (ref 37–54)
EOSINOPHIL # BLD AUTO: 0.15 10*3/MM3 (ref 0–0.4)
EOSINOPHIL NFR BLD AUTO: 2.6 % (ref 0.3–6.2)
ERYTHROCYTE [DISTWIDTH] IN BLOOD BY AUTOMATED COUNT: 12.9 % (ref 12.3–15.4)
EXPIRATION DATE: NORMAL
GFR SERPL CREATININE-BSD FRML MDRD: 68 ML/MIN/1.73
GLOBULIN UR ELPH-MCNC: 3.2 GM/DL
GLUCOSE BLD-MCNC: 88 MG/DL (ref 65–99)
HCT VFR BLD AUTO: 43.9 % (ref 34–46.6)
HDLC SERPL-MCNC: 79 MG/DL (ref 40–60)
HGB BLD-MCNC: 14.3 G/DL (ref 12–15.9)
IMM GRANULOCYTES # BLD AUTO: 0.02 10*3/MM3 (ref 0–0.05)
IMM GRANULOCYTES NFR BLD AUTO: 0.3 % (ref 0–0.5)
INTERNAL NEGATIVE CONTROL: NEGATIVE
INTERNAL POSITIVE CONTROL: POSITIVE
LDLC SERPL CALC-MCNC: 117 MG/DL (ref 0–100)
LDLC/HDLC SERPL: 1.48 {RATIO}
LYMPHOCYTES # BLD AUTO: 2.16 10*3/MM3 (ref 0.7–3.1)
LYMPHOCYTES NFR BLD AUTO: 37.2 % (ref 19.6–45.3)
Lab: NORMAL
MCH RBC QN AUTO: 30.2 PG (ref 26.6–33)
MCHC RBC AUTO-ENTMCNC: 32.6 G/DL (ref 31.5–35.7)
MCV RBC AUTO: 92.6 FL (ref 79–97)
MONOCYTES # BLD AUTO: 0.51 10*3/MM3 (ref 0.1–0.9)
MONOCYTES NFR BLD AUTO: 8.8 % (ref 5–12)
NEUTROPHILS # BLD AUTO: 2.89 10*3/MM3 (ref 1.7–7)
NEUTROPHILS NFR BLD AUTO: 49.9 % (ref 42.7–76)
NRBC BLD AUTO-RTO: 0 /100 WBC (ref 0–0.2)
PLATELET # BLD AUTO: 316 10*3/MM3 (ref 140–450)
PMV BLD AUTO: 11.8 FL (ref 6–12)
POTASSIUM BLD-SCNC: 4.6 MMOL/L (ref 3.5–5.2)
PROT SERPL-MCNC: 7.7 G/DL (ref 6–8.5)
RBC # BLD AUTO: 4.74 10*6/MM3 (ref 3.77–5.28)
SODIUM BLD-SCNC: 143 MMOL/L (ref 136–145)
TRIGL SERPL-MCNC: 59 MG/DL (ref 0–150)
TSH SERPL DL<=0.05 MIU/L-ACNC: 2.1 UIU/ML (ref 0.27–4.2)
VLDLC SERPL-MCNC: 11.8 MG/DL (ref 5–40)
WBC NRBC COR # BLD: 5.8 10*3/MM3 (ref 3.4–10.8)

## 2019-11-04 PROCEDURE — 82306 VITAMIN D 25 HYDROXY: CPT | Performed by: PHYSICIAN ASSISTANT

## 2019-11-04 PROCEDURE — 81025 URINE PREGNANCY TEST: CPT | Performed by: PHYSICIAN ASSISTANT

## 2019-11-04 PROCEDURE — 99213 OFFICE O/P EST LOW 20 MIN: CPT | Performed by: PHYSICIAN ASSISTANT

## 2019-11-04 PROCEDURE — 85025 COMPLETE CBC W/AUTO DIFF WBC: CPT | Performed by: PHYSICIAN ASSISTANT

## 2019-11-04 PROCEDURE — 36415 COLL VENOUS BLD VENIPUNCTURE: CPT | Performed by: PHYSICIAN ASSISTANT

## 2019-11-04 PROCEDURE — 80053 COMPREHEN METABOLIC PANEL: CPT | Performed by: PHYSICIAN ASSISTANT

## 2019-11-04 PROCEDURE — 90471 IMMUNIZATION ADMIN: CPT | Performed by: PHYSICIAN ASSISTANT

## 2019-11-04 PROCEDURE — 90686 IIV4 VACC NO PRSV 0.5 ML IM: CPT | Performed by: PHYSICIAN ASSISTANT

## 2019-11-04 PROCEDURE — 84443 ASSAY THYROID STIM HORMONE: CPT | Performed by: PHYSICIAN ASSISTANT

## 2019-11-04 PROCEDURE — 99396 PREV VISIT EST AGE 40-64: CPT | Performed by: PHYSICIAN ASSISTANT

## 2019-11-04 PROCEDURE — 80061 LIPID PANEL: CPT | Performed by: PHYSICIAN ASSISTANT

## 2019-11-04 RX ORDER — OMEPRAZOLE 20 MG/1
20 CAPSULE, DELAYED RELEASE ORAL DAILY
Qty: 30 CAPSULE | Refills: 11 | Status: SHIPPED | OUTPATIENT
Start: 2019-11-04 | End: 2020-10-26

## 2019-11-04 NOTE — PROGRESS NOTES
Chief Complaint   Patient presents with   • Annual Exam     fasting, w/o pap       Subjective       History of Present Illness     Kim Watson is a 64 y.o. female. She presents for her annual physical. Current concerns include arthritis of L hand, and heartburn. Pt reports arthritis in her L thumb x years. This is a new issue to our office. This is now occurring more frequently with stiffness of L thumb joint and aching at joint almost daily. She has weakness in her L thumb with stiffness, but no routine weakness if she is not experiencing arthritis symptoms. She takes Aleve for headaches PRN but nothing specifically for her arthritis, and Aleve does not improve her joint pain. She has not had imaging of her L hand/ thumb. She has not tried topicals or PO meds for this issue.     Pt also reports heartburn 2-3 nights per week for the last 3 months which is a new issue. Her dentist noted that she may have GERD several months ago, but pt was not experiencing any reflux symptoms at that time. She has not had GERD in the past. She does have heartburn but denies epigastric pain, cough, sore throat, voice change, N/V, bloating or other symptoms. No food triggers identified. She does not eat 2-3 hours before bedtime. She has not tried anything for the tx of this issue.     Pt does have anxiety and reports this is at her baseline. She continues to have day to day anxiety. This has been present since she was a teenager. She has tried valium, zoloft and buspar in the past without significant relief. She denies panic attacks. She does not report any low moods today as she has had in the past, and low moods earlier this year have since resolved. Most of her anxiety at this time is due to her mom living with her, who is in poor health. Pt sleeps 5-6 hours/ night but often sleeps poorly. She is awoken by her mom 2-3 times per night for various reasons, and she often has difficulty falling back asleep. She is not interested  in any further medication or counseling today. She does not desire medications to improve sleep as she does not want to be drowsy if her mother needs her assistance.     Are you currently seeing any other doctors or specialists? No   Are you currently taking any OTC medications or herbal medications? Aleve PRN     Sleep: 5-6 hours/ night, trouble staying asleep as above  Diet: lots of H2O, well-balanced, very healthy per pt  Exercise: exercises daily, walking, several miles most days. 30,000+ steps most days.      Most recent colonoscopy: 4/17/2018- normal, in [facility], repeat in 10 years  Most recent mammogram: Feb. 2018 (normal, per pt)   Most recent pap smear: n/a- partial hysterectomy + cervix  First day of last menses: n/a-- post-menopausal. No bleeding or spotting     Regular dental visits: Yes   Regular eye exams: Yes. Wears glasses.      Quit smoking October 2018. No cigs since that time.     PHQ-9 Depression Screening  Little interest or pleasure in doing things? 1   Feeling down, depressed, or hopeless? 1   Trouble falling or staying asleep, or sleeping too much? 3   Feeling tired or having little energy? 0   Poor appetite or overeating? 0   Feeling bad about yourself - or that you are a failure or have let yourself or your family down? 1   Trouble concentrating on things, such as reading the newspaper or watching television? 0   Moving or speaking so slowly that other people could have noticed? Or the opposite - being so fidgety or restless that you have been moving around a lot more than usual? 0   Thoughts that you would be better off dead, or of hurting yourself in some way? 0   PHQ-9 Total Score 6   If you checked off any problems, how difficult have these problems made it for you to do your work, take care of things at home, or get along with other people? Not difficult at all           The following portions of the patient's history were reviewed and updated as appropriate: allergies, current  medications, past family history, past medical history, past social history and problem list.    Allergies   Allergen Reactions   • Morphine And Related GI Intolerance     SEVERE   • Sulfa Antibiotics GI Intolerance     SEVERE     Social History     Tobacco Use   • Smoking status: Former Smoker     Types: Cigarettes     Start date:      Last attempt to quit: 10/15/2018     Years since quittin.0   • Smokeless tobacco: Never Used   Substance Use Topics   • Alcohol use: Yes     History reviewed. No pertinent surgical history.  Family History   Problem Relation Age of Onset   • Arthritis Mother    • Migraines Mother    • Cancer Father         BLADDER   • Alzheimer's disease Father    • Diabetes Brother    • Migraines Brother          Current Outpatient Medications:   •  Cholecalciferol (VITAMIN D3) 2000 units tablet, Take 1 tablet by mouth Daily., Disp: , Rfl:   •  clobetasol (TEMOVATE) 0.05 % ointment, Apply 1 application topically to the appropriate area as directed Take As Directed., Disp: 45 g, Rfl: 1  •  diclofenac (VOLTAREN) 1 % gel gel, Apply 4 g topically to the appropriate area as directed 3 (Three) Times a Day., Disp: 100 g, Rfl: 5  •  omeprazole (priLOSEC) 20 MG capsule, Take 1 capsule by mouth Daily., Disp: 30 capsule, Rfl: 11    Patient Active Problem List   Diagnosis   • Recurrent major depressive disorder (CMS/HCC)   • Migraine without aura and without status migrainosus, not intractable   • History of tobacco abuse   • Anxiety   • History of vitamin D deficiency       Review of Systems   Constitutional: Negative for appetite change, chills, fatigue, fever, unexpected weight gain and unexpected weight loss.   HENT: Negative for congestion, ear pain, sore throat and trouble swallowing.    Eyes: Negative for pain and visual disturbance.   Respiratory: Negative for cough, shortness of breath and wheezing.    Cardiovascular: Negative for chest pain.   Gastrointestinal: Positive for GERD. Negative for  abdominal pain, diarrhea, nausea and vomiting.   Endocrine: Negative for cold intolerance and heat intolerance.   Genitourinary: Negative for breast lump, breast pain, dysuria and hematuria.   Musculoskeletal: Positive for arthralgias. Negative for back pain.   Skin: Negative for rash.   Allergic/Immunologic: Negative for immunocompromised state.   Neurological: Negative for dizziness and headache.   Psychiatric/Behavioral: Positive for sleep disturbance. Negative for depressed mood. The patient is nervous/anxious.        Objective   Vitals:    11/04/19 1014   BP: 120/80   Pulse: 62   Resp: 15   Temp: 97.7 °F (36.5 °C)   SpO2: 97%     Physical Exam   Constitutional: She is oriented to person, place, and time. She appears well-developed and well-nourished.   HENT:   Head: Normocephalic and atraumatic.   Right Ear: Tympanic membrane, external ear and ear canal normal. No tenderness.   Left Ear: Tympanic membrane, external ear and ear canal normal. No tenderness.   Mouth/Throat: Uvula is midline and oropharynx is clear and moist. No oral lesions.   Eyes: Conjunctivae and EOM are normal. Pupils are equal, round, and reactive to light. No scleral icterus.   Neck: Trachea normal and normal range of motion. Neck supple. Carotid bruit is not present. No thyroid mass and no thyromegaly present.   Cardiovascular: Normal rate, regular rhythm, normal heart sounds and normal pulses. Exam reveals no gallop.   No murmur heard.  Pulses:       Radial pulses are 2+ on the right side, and 2+ on the left side.        Dorsalis pedis pulses are 2+ on the right side, and 2+ on the left side.   Pulmonary/Chest: Effort normal and breath sounds normal. She has no wheezes. She has no rales. She exhibits no tenderness and no deformity. Right breast exhibits no mass. Left breast exhibits no mass.   Abdominal: Soft. Bowel sounds are normal. She exhibits no mass. There is no hepatosplenomegaly. There is no tenderness.   Musculoskeletal: Normal  range of motion. She exhibits no edema or deformity.        Left hand: She exhibits normal range of motion, no tenderness, normal capillary refill and no deformity. Normal strength noted.   Lymphadenopathy:     She has no cervical adenopathy.     She has no axillary adenopathy.   Neurological: She is alert and oriented to person, place, and time. She has normal strength.   Skin: Skin is warm and dry. No rash noted.   No atypical nevi.    Psychiatric: Her behavior is normal. Her mood appears anxious. She does not exhibit a depressed mood.   Vitals reviewed.            Assessment/Plan   Kim was seen today for annual exam.    Diagnoses and all orders for this visit:    Encounter for health maintenance examination  -     CBC & Differential  -     Comprehensive Metabolic Panel  -     TSH  -     Vitamin D 25 Hydroxy  -     Lipid Panel    Need for influenza vaccination  -     Fluarix/Fluzone/Afluria Quad>6 Months    History of vitamin D deficiency  -     Vitamin D 25 Hydroxy    Arthritis of left hand  -     diclofenac (VOLTAREN) 1 % gel gel; Apply 4 g topically to the appropriate area as directed 3 (Three) Times a Day.    Screening for lipid disorders  -     Lipid Panel    Gastroesophageal reflux disease, esophagitis presence not specified  -     CBC & Differential  -     omeprazole (priLOSEC) 20 MG capsule; Take 1 capsule by mouth Daily.    Breast cancer screening  -     Mammo Screening Digital Tomosynthesis Bilateral With CAD; Future    Anxiety  - As above, pt is not interested in further medications or counseling at this time.            Patient education discussed during this visit:  - avoidance of texting while driving and the need for wearing seatbelt  - use of sunscreen  - healthy sleep habits and appropriate amount of sleep  - H2O consumption, well-balanced diet  - exercise routine which includes at least 150 minutes of cardio per week + muscle strengthening exercises  - immunizations including annual flu  vaccination      Return in about 1 year (around 11/4/2020) for Annual physical.

## 2020-01-22 ENCOUNTER — APPOINTMENT (OUTPATIENT)
Dept: MAMMOGRAPHY | Facility: HOSPITAL | Age: 65
End: 2020-01-22

## 2020-10-07 ENCOUNTER — TRANSCRIBE ORDERS (OUTPATIENT)
Dept: INTERNAL MEDICINE | Facility: CLINIC | Age: 65
End: 2020-10-07

## 2020-10-07 DIAGNOSIS — Z12.31 VISIT FOR SCREENING MAMMOGRAM: Primary | ICD-10-CM

## 2020-10-26 DIAGNOSIS — K21.9 GASTROESOPHAGEAL REFLUX DISEASE: ICD-10-CM

## 2020-10-26 RX ORDER — OMEPRAZOLE 20 MG/1
CAPSULE, DELAYED RELEASE ORAL
Qty: 30 CAPSULE | Refills: 11 | Status: SHIPPED | OUTPATIENT
Start: 2020-10-26 | End: 2020-11-20

## 2020-11-20 ENCOUNTER — OFFICE VISIT (OUTPATIENT)
Dept: INTERNAL MEDICINE | Facility: CLINIC | Age: 65
End: 2020-11-20

## 2020-11-20 VITALS
TEMPERATURE: 97 F | BODY MASS INDEX: 22.47 KG/M2 | RESPIRATION RATE: 14 BRPM | HEART RATE: 56 BPM | DIASTOLIC BLOOD PRESSURE: 60 MMHG | WEIGHT: 131.6 LBS | OXYGEN SATURATION: 98 % | SYSTOLIC BLOOD PRESSURE: 108 MMHG | HEIGHT: 64 IN

## 2020-11-20 DIAGNOSIS — Z86.39 HISTORY OF VITAMIN D DEFICIENCY: ICD-10-CM

## 2020-11-20 DIAGNOSIS — Z13.220 SCREENING FOR LIPID DISORDERS: ICD-10-CM

## 2020-11-20 DIAGNOSIS — Z00.00 ENCOUNTER FOR HEALTH MAINTENANCE EXAMINATION: Primary | ICD-10-CM

## 2020-11-20 LAB
25(OH)D3 SERPL-MCNC: 72.1 NG/ML (ref 30–100)
ALBUMIN SERPL-MCNC: 4.6 G/DL (ref 3.5–5.2)
ALBUMIN/GLOB SERPL: 1.4 G/DL
ALP SERPL-CCNC: 88 U/L (ref 39–117)
ALT SERPL W P-5'-P-CCNC: 13 U/L (ref 1–33)
ANION GAP SERPL CALCULATED.3IONS-SCNC: 9.3 MMOL/L (ref 5–15)
AST SERPL-CCNC: 25 U/L (ref 1–32)
BASOPHILS # BLD AUTO: 0.06 10*3/MM3 (ref 0–0.2)
BASOPHILS NFR BLD AUTO: 0.9 % (ref 0–1.5)
BILIRUB BLD-MCNC: NEGATIVE MG/DL
BILIRUB SERPL-MCNC: 0.4 MG/DL (ref 0–1.2)
BUN SERPL-MCNC: 13 MG/DL (ref 8–23)
BUN/CREAT SERPL: 15.5 (ref 7–25)
CALCIUM SPEC-SCNC: 9.7 MG/DL (ref 8.6–10.5)
CHLORIDE SERPL-SCNC: 104 MMOL/L (ref 98–107)
CHOLEST SERPL-MCNC: 207 MG/DL (ref 0–200)
CLARITY, POC: CLEAR
CO2 SERPL-SCNC: 26.7 MMOL/L (ref 22–29)
COLOR UR: YELLOW
CREAT SERPL-MCNC: 0.84 MG/DL (ref 0.57–1)
DEPRECATED RDW RBC AUTO: 40.4 FL (ref 37–54)
EOSINOPHIL # BLD AUTO: 0.09 10*3/MM3 (ref 0–0.4)
EOSINOPHIL NFR BLD AUTO: 1.4 % (ref 0.3–6.2)
ERYTHROCYTE [DISTWIDTH] IN BLOOD BY AUTOMATED COUNT: 12.3 % (ref 12.3–15.4)
EXPIRATION DATE: NORMAL
GFR SERPL CREATININE-BSD FRML MDRD: 68 ML/MIN/1.73
GLOBULIN UR ELPH-MCNC: 3.2 GM/DL
GLUCOSE SERPL-MCNC: 86 MG/DL (ref 65–99)
GLUCOSE UR STRIP-MCNC: NEGATIVE MG/DL
HCT VFR BLD AUTO: 43.3 % (ref 34–46.6)
HDLC SERPL-MCNC: 77 MG/DL (ref 40–60)
HGB BLD-MCNC: 14.5 G/DL (ref 12–15.9)
IMM GRANULOCYTES # BLD AUTO: 0.03 10*3/MM3 (ref 0–0.05)
IMM GRANULOCYTES NFR BLD AUTO: 0.5 % (ref 0–0.5)
KETONES UR QL: NEGATIVE
LDLC SERPL CALC-MCNC: 116 MG/DL (ref 0–100)
LDLC/HDLC SERPL: 1.48 {RATIO}
LEUKOCYTE EST, POC: NEGATIVE
LYMPHOCYTES # BLD AUTO: 2.01 10*3/MM3 (ref 0.7–3.1)
LYMPHOCYTES NFR BLD AUTO: 31.4 % (ref 19.6–45.3)
Lab: NORMAL
MCH RBC QN AUTO: 30.1 PG (ref 26.6–33)
MCHC RBC AUTO-ENTMCNC: 33.5 G/DL (ref 31.5–35.7)
MCV RBC AUTO: 90 FL (ref 79–97)
MONOCYTES # BLD AUTO: 0.47 10*3/MM3 (ref 0.1–0.9)
MONOCYTES NFR BLD AUTO: 7.3 % (ref 5–12)
NEUTROPHILS NFR BLD AUTO: 3.75 10*3/MM3 (ref 1.7–7)
NEUTROPHILS NFR BLD AUTO: 58.5 % (ref 42.7–76)
NITRITE UR-MCNC: NEGATIVE MG/ML
NRBC BLD AUTO-RTO: 0 /100 WBC (ref 0–0.2)
PH UR: 6.5 [PH] (ref 5–8)
PLATELET # BLD AUTO: 307 10*3/MM3 (ref 140–450)
PMV BLD AUTO: 11.6 FL (ref 6–12)
POTASSIUM SERPL-SCNC: 4 MMOL/L (ref 3.5–5.2)
PROT SERPL-MCNC: 7.8 G/DL (ref 6–8.5)
PROT UR STRIP-MCNC: NEGATIVE MG/DL
RBC # BLD AUTO: 4.81 10*6/MM3 (ref 3.77–5.28)
RBC # UR STRIP: NEGATIVE /UL
SODIUM SERPL-SCNC: 140 MMOL/L (ref 136–145)
SP GR UR: 1.01 (ref 1–1.03)
TRIGL SERPL-MCNC: 80 MG/DL (ref 0–150)
UROBILINOGEN UR QL: NORMAL
VLDLC SERPL-MCNC: 14 MG/DL (ref 5–40)
WBC # BLD AUTO: 6.41 10*3/MM3 (ref 3.4–10.8)

## 2020-11-20 PROCEDURE — 80061 LIPID PANEL: CPT | Performed by: PHYSICIAN ASSISTANT

## 2020-11-20 PROCEDURE — 99396 PREV VISIT EST AGE 40-64: CPT | Performed by: PHYSICIAN ASSISTANT

## 2020-11-20 PROCEDURE — 36415 COLL VENOUS BLD VENIPUNCTURE: CPT | Performed by: PHYSICIAN ASSISTANT

## 2020-11-20 PROCEDURE — 81003 URINALYSIS AUTO W/O SCOPE: CPT | Performed by: PHYSICIAN ASSISTANT

## 2020-11-20 PROCEDURE — 82306 VITAMIN D 25 HYDROXY: CPT | Performed by: PHYSICIAN ASSISTANT

## 2020-11-20 PROCEDURE — 80053 COMPREHEN METABOLIC PANEL: CPT | Performed by: PHYSICIAN ASSISTANT

## 2020-11-20 PROCEDURE — 85025 COMPLETE CBC W/AUTO DIFF WBC: CPT | Performed by: PHYSICIAN ASSISTANT

## 2020-11-20 NOTE — PROGRESS NOTES
Chief Complaint   Patient presents with   • Annual Exam       Subjective       History of Present Illness     Kim Watson is a 65 y.o. female. She presents for her annual exam. She has no acute concerns today. Would like to stop her Vit D supplement if levels WNL and she has been trying to get outside for 20 mins per day for sun exposure.     Are you currently seeing any other doctors or specialists? No   Are you currently taking any OTC medications or herbal medications? No     Sleep: 5-6 hours/ night   Diet: lots of H2O, well-balanced, very healthy per pt  Exercise: exercises daily, walking, several miles most days. 30,000+ steps most days.      Most recent colonoscopy: 2018- normal, in [facility], repeat in 10 years  Most recent mammogram: 2018 (normal, per pt), scheduled 2021  Most recent pap smear: n/a- partial hysterectomy + cervix  First day of last menses: n/a-- post-menopausal     Regular dental visits: Yes, UTD   Regular eye exams: Yes, UTD, wears glasses.       PHQ-2 Depression Screening  Little interest or pleasure in doing things? 0   Feeling down, depressed, or hopeless? 0   PHQ-2 Total Score 0         The following portions of the patient's history were reviewed and updated as appropriate: allergies, current medications, past family history, past medical history, past social history, past surgical history and problem list.    Allergies   Allergen Reactions   • Morphine And Related GI Intolerance     SEVERE   • Sulfa Antibiotics GI Intolerance     SEVERE     Social History     Tobacco Use   • Smoking status: Former Smoker     Types: Cigarettes     Start date:      Quit date: 10/15/2018     Years since quittin.1   • Smokeless tobacco: Never Used   Substance Use Topics   • Alcohol use: Yes     History reviewed. No pertinent surgical history.  Family History   Problem Relation Age of Onset   • Arthritis Mother    • Migraines Mother    • Cancer Father         BLADDER   • Alzheimer's  disease Father    • Diabetes Brother    • Migraines Brother          Current Outpatient Medications:   •  Cholecalciferol (VITAMIN D3) 2000 units tablet, Take 1 tablet by mouth Daily., Disp: , Rfl:     Patient Active Problem List   Diagnosis   • Recurrent major depressive disorder (CMS/HCC)   • Migraine without aura and without status migrainosus, not intractable   • History of tobacco abuse   • Anxiety   • History of vitamin D deficiency       Review of Systems   Constitutional: Negative for chills, fatigue, fever, unexpected weight gain and unexpected weight loss.   HENT: Negative for congestion, ear pain, sore throat and trouble swallowing.    Eyes: Negative for pain.   Respiratory: Negative for cough, shortness of breath and wheezing.    Cardiovascular: Negative for chest pain, palpitations and leg swelling.   Gastrointestinal: Negative for abdominal pain, blood in stool, diarrhea, nausea and vomiting.   Genitourinary: Negative for dysuria and hematuria.   Musculoskeletal: Negative for back pain.   Skin: Negative for rash.   Allergic/Immunologic: Negative for immunocompromised state.   Neurological: Negative for dizziness, syncope, weakness and headache.   Psychiatric/Behavioral: Negative for depressed mood. The patient is not nervous/anxious.        Objective   Vitals:    11/20/20 1113   BP: 108/60   Pulse: 56   Resp: 14   Temp: 97 °F (36.1 °C)   SpO2: 98%     Physical Exam  Vitals signs reviewed.   Constitutional:       Appearance: She is well-developed.   HENT:      Head: Normocephalic and atraumatic.      Right Ear: Tympanic membrane, ear canal and external ear normal. No tenderness.      Left Ear: Tympanic membrane, ear canal and external ear normal. No tenderness.      Nose: Nose normal.      Mouth/Throat:      Mouth: No oral lesions.      Pharynx: Uvula midline.   Eyes:      General: No scleral icterus.     Conjunctiva/sclera: Conjunctivae normal.      Pupils: Pupils are equal, round, and reactive to  light.   Neck:      Musculoskeletal: Normal range of motion and neck supple.      Thyroid: No thyroid mass or thyromegaly.      Vascular: No carotid bruit.      Trachea: Trachea normal.   Cardiovascular:      Rate and Rhythm: Normal rate and regular rhythm.      Pulses: Normal pulses.           Radial pulses are 2+ on the right side and 2+ on the left side.        Dorsalis pedis pulses are 2+ on the right side and 2+ on the left side.      Heart sounds: Normal heart sounds. No murmur. No gallop.    Pulmonary:      Effort: Pulmonary effort is normal.      Breath sounds: Normal breath sounds. No wheezing or rales.   Chest:      Chest wall: No deformity or tenderness.      Breasts:         Right: No mass.         Left: No mass.   Abdominal:      General: Bowel sounds are normal. There is no distension.      Palpations: Abdomen is soft. There is no mass.      Tenderness: There is no abdominal tenderness.   Musculoskeletal: Normal range of motion.         General: No deformity.   Lymphadenopathy:      Cervical: No cervical adenopathy.   Skin:     General: Skin is warm and dry.      Findings: No rash.      Comments: No atypical nevi.    Neurological:      Mental Status: She is alert and oriented to person, place, and time.   Psychiatric:         Behavior: Behavior normal.               Assessment/Plan   Diagnoses and all orders for this visit:    1. Encounter for health maintenance examination (Primary)  -     CBC & Differential  -     Comprehensive Metabolic Panel  -     Lipid Panel  -     POC Urinalysis Dipstick, Automated  -     Vitamin D 25 Hydroxy  -     CBC Auto Differential    2. History of vitamin D deficiency  -     Vitamin D 25 Hydroxy    3. Screening for lipid disorders  -     Lipid Panel      Pt may discontinue Vit D once resulted if her levels are adequate.   Further plans after review of labs.   Discussed Shingrix vaccine- pt will inquire about this at her pharmacy.          Patient education discussed  during this visit:  - avoidance of texting while driving and the need for wearing seatbelt  - use of sunscreen  - healthy sleep habits and appropriate amount of sleep  - H2O consumption, well-balanced diet  - exercise routine which includes at least 150 minutes of cardio per week + muscle strengthening exercises  - immunizations including annual flu vaccination      Return in about 1 year (around 11/20/2021) for Annual physical.

## 2021-01-29 ENCOUNTER — APPOINTMENT (OUTPATIENT)
Dept: OTHER | Facility: HOSPITAL | Age: 66
End: 2021-01-29

## 2021-01-29 ENCOUNTER — HOSPITAL ENCOUNTER (OUTPATIENT)
Dept: MAMMOGRAPHY | Facility: HOSPITAL | Age: 66
Discharge: HOME | End: 2021-01-29
Admitting: PHYSICIAN ASSISTANT

## 2021-01-29 DIAGNOSIS — Z12.31 VISIT FOR SCREENING MAMMOGRAM: ICD-10-CM

## 2021-01-29 PROCEDURE — 77063 BREAST TOMOSYNTHESIS BI: CPT | Performed by: RADIOLOGY

## 2021-01-29 PROCEDURE — 77067 SCR MAMMO BI INCL CAD: CPT | Performed by: RADIOLOGY

## 2021-01-29 PROCEDURE — 77063 BREAST TOMOSYNTHESIS BI: CPT

## 2021-01-29 PROCEDURE — 77067 SCR MAMMO BI INCL CAD: CPT

## 2021-11-29 ENCOUNTER — OFFICE VISIT (OUTPATIENT)
Dept: INTERNAL MEDICINE | Facility: CLINIC | Age: 66
End: 2021-11-29

## 2021-11-29 VITALS
SYSTOLIC BLOOD PRESSURE: 124 MMHG | HEIGHT: 64 IN | HEART RATE: 60 BPM | DIASTOLIC BLOOD PRESSURE: 76 MMHG | RESPIRATION RATE: 14 BRPM | OXYGEN SATURATION: 98 % | TEMPERATURE: 98 F | WEIGHT: 135.2 LBS | BODY MASS INDEX: 23.08 KG/M2

## 2021-11-29 DIAGNOSIS — E78.49 OTHER HYPERLIPIDEMIA: ICD-10-CM

## 2021-11-29 DIAGNOSIS — Z86.39 HISTORY OF VITAMIN D DEFICIENCY: ICD-10-CM

## 2021-11-29 DIAGNOSIS — Z00.00 ENCOUNTER FOR HEALTH MAINTENANCE EXAMINATION: Primary | ICD-10-CM

## 2021-11-29 LAB
BILIRUB BLD-MCNC: NEGATIVE MG/DL
CLARITY, POC: CLEAR
COLOR UR: YELLOW
EXPIRATION DATE: NORMAL
GLUCOSE UR STRIP-MCNC: NEGATIVE MG/DL
KETONES UR QL: NEGATIVE
LEUKOCYTE EST, POC: NEGATIVE
Lab: NORMAL
NITRITE UR-MCNC: NEGATIVE MG/ML
PH UR: 7 [PH] (ref 5–8)
PROT UR STRIP-MCNC: NEGATIVE MG/DL
RBC # UR STRIP: NEGATIVE /UL
SP GR UR: 1 (ref 1–1.03)
UROBILINOGEN UR QL: NORMAL

## 2021-11-29 PROCEDURE — 36415 COLL VENOUS BLD VENIPUNCTURE: CPT | Performed by: PHYSICIAN ASSISTANT

## 2021-11-29 PROCEDURE — 80061 LIPID PANEL: CPT | Performed by: PHYSICIAN ASSISTANT

## 2021-11-29 PROCEDURE — 82306 VITAMIN D 25 HYDROXY: CPT | Performed by: PHYSICIAN ASSISTANT

## 2021-11-29 PROCEDURE — 85025 COMPLETE CBC W/AUTO DIFF WBC: CPT | Performed by: PHYSICIAN ASSISTANT

## 2021-11-29 PROCEDURE — 81003 URINALYSIS AUTO W/O SCOPE: CPT | Performed by: PHYSICIAN ASSISTANT

## 2021-11-29 PROCEDURE — 99396 PREV VISIT EST AGE 40-64: CPT | Performed by: PHYSICIAN ASSISTANT

## 2021-11-29 PROCEDURE — 80053 COMPREHEN METABOLIC PANEL: CPT | Performed by: PHYSICIAN ASSISTANT

## 2021-11-29 NOTE — PROGRESS NOTES
Chief Complaint   Patient presents with   • Annual Exam       Subjective       History of Present Illness     Kim Watson is a 66 y.o. female. She presents for her annual physical. Overall doing very well with no acute concerns today. Reports sneezing and occasional headaches secondary to allergies, tylenol improves headaches.       Are you currently seeing any other doctors or specialists? No   Are you currently taking any OTC medications or herbal medications? No      Sleep: 5-6 hours/ night   Diet: lots of H2O, well-balanced, very healthy per pt  Exercise: exercises daily, walking, several miles most days     Most recent colonoscopy: 4/17/2018- normal, in [facility], repeat in 10 years  Most recent mammogram: 1/29/2021  Most recent pap smear: n/a- partial hysterectomy + cervix  First day of last menses: n/a-- post-menopausal     Regular dental visits: Yes, UTD   Regular eye exams: Yes, UTD, wears glasses        PHQ-2 Depression Screening  Little interest or pleasure in doing things? 0   Feeling down, depressed, or hopeless? 1   PHQ-2 Total Score 1         The following portions of the patient's history were reviewed and updated as appropriate: allergies, current medications, past family history, past medical history, past social history and problem list.    Allergies   Allergen Reactions   • Morphine And Related GI Intolerance     SEVERE   • Sulfa Antibiotics GI Intolerance     SEVERE     Social History     Tobacco Use   • Smoking status: Former Smoker     Packs/day: 1.00     Years: 15.00     Pack years: 15.00     Types: Cigarettes     Start date: 1974     Quit date: 10/15/2018     Years since quitting: 3.1   • Smokeless tobacco: Never Used   Substance Use Topics   • Alcohol use: Yes     Alcohol/week: 2.0 standard drinks     Types: 2 Glasses of wine per week     Past Surgical History:   Procedure Laterality Date   • HYSTERECTOMY       Family History   Problem Relation Age of Onset   • Arthritis Mother    •  Migraines Mother    • Cancer Father         BLADDER   • Alzheimer's disease Father    • Diabetes Brother    • Migraines Brother        No current outpatient medications on file.    Patient Active Problem List   Diagnosis   • Recurrent major depressive disorder (HCC)   • Migraine without aura and without status migrainosus, not intractable   • History of tobacco abuse   • Anxiety   • History of vitamin D deficiency       Review of Systems   Constitutional: Negative for chills, fatigue, fever, unexpected weight gain and unexpected weight loss.   HENT: Positive for sneezing. Negative for congestion, ear pain, sore throat and trouble swallowing.    Eyes: Negative for pain.   Respiratory: Negative for cough, shortness of breath and wheezing.    Cardiovascular: Negative for chest pain and palpitations.   Gastrointestinal: Negative for abdominal pain, blood in stool, diarrhea, nausea and vomiting.   Genitourinary: Negative for breast lump, breast pain, dysuria and hematuria.   Musculoskeletal: Negative for arthralgias and back pain.   Skin: Negative for rash.   Allergic/Immunologic: Negative for immunocompromised state.   Neurological: Positive for headache. Negative for dizziness, syncope and weakness.   Psychiatric/Behavioral: Negative for depressed mood. The patient is not nervous/anxious.        Objective   Vitals:    11/29/21 1100   BP: 124/76   Pulse: 60   Resp: 14   Temp: 98 °F (36.7 °C)   SpO2: 98%     Body mass index is 23.51 kg/m².    Physical Exam  Vitals reviewed.   Constitutional:       Appearance: She is well-developed.   HENT:      Head: Normocephalic and atraumatic.      Right Ear: Tympanic membrane, ear canal and external ear normal. No tenderness.      Left Ear: Tympanic membrane, ear canal and external ear normal. No tenderness.      Nose: Nose normal.      Mouth/Throat:      Mouth: Mucous membranes are moist. No oral lesions.      Pharynx: Oropharynx is clear. Uvula midline.   Eyes:      General: No  scleral icterus.     Conjunctiva/sclera: Conjunctivae normal.      Pupils: Pupils are equal, round, and reactive to light.   Neck:      Thyroid: No thyroid mass or thyromegaly.      Vascular: No carotid bruit.      Trachea: Trachea normal.   Cardiovascular:      Rate and Rhythm: Normal rate and regular rhythm.      Pulses: Normal pulses.           Radial pulses are 2+ on the right side and 2+ on the left side.        Dorsalis pedis pulses are 2+ on the right side and 2+ on the left side.      Heart sounds: Normal heart sounds. No murmur heard.  No gallop.    Pulmonary:      Effort: Pulmonary effort is normal.      Breath sounds: Normal breath sounds. No wheezing or rales.   Chest:      Chest wall: No deformity or tenderness.   Breasts:      Right: No mass.      Left: No mass.       Abdominal:      General: Bowel sounds are normal. There is no distension.      Palpations: Abdomen is soft. There is no mass.      Tenderness: There is no abdominal tenderness.   Musculoskeletal:         General: No deformity. Normal range of motion.      Cervical back: Normal range of motion and neck supple.   Lymphadenopathy:      Cervical: No cervical adenopathy.   Skin:     General: Skin is warm and dry.      Findings: No rash.      Comments: No atypical nevi.    Neurological:      Mental Status: She is alert and oriented to person, place, and time.   Psychiatric:         Behavior: Behavior normal.               Assessment/Plan   Diagnoses and all orders for this visit:    1. Encounter for health maintenance examination (Primary)  -     CBC & Differential; Future  -     Comprehensive Metabolic Panel; Future  -     Lipid Panel; Future  -     POC Urinalysis Dipstick, Automated    2. Other hyperlipidemia  -     Lipid Panel; Future    3. History of vitamin D deficiency  -     Vitamin D 25 Hydroxy; Future                   Patient education discussed during this visit:  - avoidance of texting while driving and the need for wearing  seatbelt  - use of sunscreen  - healthy sleep habits and appropriate amount of sleep  - H2O consumption, well-balanced diet  - exercise routine which includes at least 150 minutes of cardio per week + muscle strengthening exercises  - immunizations including annual flu vaccination      Return in about 1 year (around 11/29/2022) for Annual physical.

## 2021-11-30 LAB
25(OH)D3 SERPL-MCNC: 51.7 NG/ML (ref 30–100)
ALBUMIN SERPL-MCNC: 4.4 G/DL (ref 3.5–5.2)
ALBUMIN/GLOB SERPL: 1.8 G/DL
ALP SERPL-CCNC: 71 U/L (ref 39–117)
ALT SERPL W P-5'-P-CCNC: 14 U/L (ref 1–33)
ANION GAP SERPL CALCULATED.3IONS-SCNC: 8.3 MMOL/L (ref 5–15)
AST SERPL-CCNC: 21 U/L (ref 1–32)
BASOPHILS # BLD AUTO: 0.06 10*3/MM3 (ref 0–0.2)
BASOPHILS NFR BLD AUTO: 1 % (ref 0–1.5)
BILIRUB SERPL-MCNC: 0.2 MG/DL (ref 0–1.2)
BUN SERPL-MCNC: 13 MG/DL (ref 8–23)
BUN/CREAT SERPL: 18.8 (ref 7–25)
CALCIUM SPEC-SCNC: 9.5 MG/DL (ref 8.6–10.5)
CHLORIDE SERPL-SCNC: 106 MMOL/L (ref 98–107)
CHOLEST SERPL-MCNC: 201 MG/DL (ref 0–200)
CO2 SERPL-SCNC: 26.7 MMOL/L (ref 22–29)
CREAT SERPL-MCNC: 0.69 MG/DL (ref 0.57–1)
DEPRECATED RDW RBC AUTO: 41.1 FL (ref 37–54)
EOSINOPHIL # BLD AUTO: 0.07 10*3/MM3 (ref 0–0.4)
EOSINOPHIL NFR BLD AUTO: 1.2 % (ref 0.3–6.2)
ERYTHROCYTE [DISTWIDTH] IN BLOOD BY AUTOMATED COUNT: 12.1 % (ref 12.3–15.4)
GFR SERPL CREATININE-BSD FRML MDRD: 86 ML/MIN/1.73
GLOBULIN UR ELPH-MCNC: 2.5 GM/DL
GLUCOSE SERPL-MCNC: 83 MG/DL (ref 65–99)
HCT VFR BLD AUTO: 40.6 % (ref 34–46.6)
HDLC SERPL-MCNC: 78 MG/DL (ref 40–60)
HGB BLD-MCNC: 13.6 G/DL (ref 12–15.9)
IMM GRANULOCYTES # BLD AUTO: 0.03 10*3/MM3 (ref 0–0.05)
IMM GRANULOCYTES NFR BLD AUTO: 0.5 % (ref 0–0.5)
LDLC SERPL CALC-MCNC: 111 MG/DL (ref 0–100)
LDLC/HDLC SERPL: 1.41 {RATIO}
LYMPHOCYTES # BLD AUTO: 2.13 10*3/MM3 (ref 0.7–3.1)
LYMPHOCYTES NFR BLD AUTO: 36.5 % (ref 19.6–45.3)
MCH RBC QN AUTO: 31.1 PG (ref 26.6–33)
MCHC RBC AUTO-ENTMCNC: 33.5 G/DL (ref 31.5–35.7)
MCV RBC AUTO: 92.7 FL (ref 79–97)
MONOCYTES # BLD AUTO: 0.45 10*3/MM3 (ref 0.1–0.9)
MONOCYTES NFR BLD AUTO: 7.7 % (ref 5–12)
NEUTROPHILS NFR BLD AUTO: 3.09 10*3/MM3 (ref 1.7–7)
NEUTROPHILS NFR BLD AUTO: 53.1 % (ref 42.7–76)
NRBC BLD AUTO-RTO: 0 /100 WBC (ref 0–0.2)
PLATELET # BLD AUTO: 310 10*3/MM3 (ref 140–450)
PMV BLD AUTO: 10.7 FL (ref 6–12)
POTASSIUM SERPL-SCNC: 4 MMOL/L (ref 3.5–5.2)
PROT SERPL-MCNC: 6.9 G/DL (ref 6–8.5)
RBC # BLD AUTO: 4.38 10*6/MM3 (ref 3.77–5.28)
SODIUM SERPL-SCNC: 141 MMOL/L (ref 136–145)
TRIGL SERPL-MCNC: 66 MG/DL (ref 0–150)
VLDLC SERPL-MCNC: 12 MG/DL (ref 5–40)
WBC NRBC COR # BLD: 5.83 10*3/MM3 (ref 3.4–10.8)

## 2022-12-06 ENCOUNTER — OFFICE VISIT (OUTPATIENT)
Dept: INTERNAL MEDICINE | Facility: CLINIC | Age: 67
End: 2022-12-06

## 2022-12-06 VITALS
HEART RATE: 64 BPM | DIASTOLIC BLOOD PRESSURE: 78 MMHG | BODY MASS INDEX: 24.24 KG/M2 | TEMPERATURE: 97.3 F | RESPIRATION RATE: 18 BRPM | HEIGHT: 64 IN | OXYGEN SATURATION: 97 % | SYSTOLIC BLOOD PRESSURE: 128 MMHG | WEIGHT: 142 LBS

## 2022-12-06 DIAGNOSIS — Z23 ENCOUNTER FOR VACCINATION: ICD-10-CM

## 2022-12-06 DIAGNOSIS — Z12.2 SCREENING FOR LUNG CANCER: ICD-10-CM

## 2022-12-06 DIAGNOSIS — Z13.820 SCREENING FOR OSTEOPOROSIS: ICD-10-CM

## 2022-12-06 DIAGNOSIS — M15.9 PRIMARY OSTEOARTHRITIS INVOLVING MULTIPLE JOINTS: ICD-10-CM

## 2022-12-06 DIAGNOSIS — Z09 ENCOUNTER FOR FOLLOW-UP EXAMINATION AFTER COMPLETED TREATMENT FOR CONDITIONS OTHER THAN MALIGNANT NEOPLASM: ICD-10-CM

## 2022-12-06 DIAGNOSIS — F41.9 ANXIETY AND DEPRESSION: ICD-10-CM

## 2022-12-06 DIAGNOSIS — Z87.891 PERSONAL HISTORY OF NICOTINE DEPENDENCE: ICD-10-CM

## 2022-12-06 DIAGNOSIS — Z00.00 WELCOME TO MEDICARE PREVENTIVE VISIT: Primary | ICD-10-CM

## 2022-12-06 DIAGNOSIS — F32.A ANXIETY AND DEPRESSION: ICD-10-CM

## 2022-12-06 DIAGNOSIS — Z12.31 ENCOUNTER FOR SCREENING MAMMOGRAM FOR MALIGNANT NEOPLASM OF BREAST: ICD-10-CM

## 2022-12-06 PROBLEM — M15.0 PRIMARY OSTEOARTHRITIS INVOLVING MULTIPLE JOINTS: Status: ACTIVE | Noted: 2022-12-06

## 2022-12-06 PROBLEM — Z86.39 HISTORY OF VITAMIN D DEFICIENCY: Status: RESOLVED | Noted: 2019-11-04 | Resolved: 2022-12-06

## 2022-12-06 PROCEDURE — 90677 PCV20 VACCINE IM: CPT | Performed by: STUDENT IN AN ORGANIZED HEALTH CARE EDUCATION/TRAINING PROGRAM

## 2022-12-06 PROCEDURE — G0009 ADMIN PNEUMOCOCCAL VACCINE: HCPCS | Performed by: STUDENT IN AN ORGANIZED HEALTH CARE EDUCATION/TRAINING PROGRAM

## 2022-12-06 PROCEDURE — 1170F FXNL STATUS ASSESSED: CPT | Performed by: STUDENT IN AN ORGANIZED HEALTH CARE EDUCATION/TRAINING PROGRAM

## 2022-12-06 PROCEDURE — 1160F RVW MEDS BY RX/DR IN RCRD: CPT | Performed by: STUDENT IN AN ORGANIZED HEALTH CARE EDUCATION/TRAINING PROGRAM

## 2022-12-06 PROCEDURE — G0402 INITIAL PREVENTIVE EXAM: HCPCS | Performed by: STUDENT IN AN ORGANIZED HEALTH CARE EDUCATION/TRAINING PROGRAM

## 2022-12-06 NOTE — PROGRESS NOTES
The ABCs of the Annual Wellness Visit  Welcome to Medicare Visit    Chief Complaint   Patient presents with   • grief   • Non-restorative Sleep   • Medicare Wellness-subsequent     Subjective {   History of Present Illness:  Kim Watson is a 67 y.o. female who presents for a  Welcome to Medicare Visit.  Anxiety and depression  The patient states that her mother-in-law passed away in 01/2022. She states that her  passed away unexpectedly in 03/2022. She states that he was fine and he just passed away unexpectedly. She states that they had no idea what happened. No heart attack, no aneurysm and no stroke. She states that he was sitting on the couch talking to her and the next thing he was gone. She states that they spent 8 days at the hospital, but he never came back.  She states that after her  passing her mother passed away. She states that she was with her  for 35 years. She states that she is trying to settle the 2 estates. She states that her son lives with her and she talk to him sometimes. She states that she talked with her mom and  all the time. She states that her mom lived with them for 12 years. She states that the last month of her life she put her in assisted living because she could not take care of her by herself. She states that her daughter is there when she needs her. She states that her sister comes by the house. She admits to talking with grief counseling through [facility] a few times, which has not helped. She states that she was getting ready to quit talking to grief counseling because she told the counselor that she could handle it on her own. She states that talking to her family at home helps. She states that she looked online at stuff and she purchased a book to read. She states that 2 years ago she lost her best friend. She states that it has been terrible for her. She states that at times it overwhelms her to where she can not breathe for a few seconds and  it takes her down. She admits to crying significantly. She states that she has 2 jose retrievers that she takes care of. She states that it was hard for the dogs when her  passed away. She states that it has been hard for all of them. She states that her daughter is worried about her because she do not sleep. She states that she falls apart easily and that she try not to cry around people, which she does at home alone. She feels that it is not going to get any better.     Osteoarthritis  The patient states that she thinks that she may have arthritis in her hands. She denies ever being diagnosed. She states that there is a significant amount of pain in her hands. She states that it is hard to bend her fingers; however, they will bend. She states that it is painful  around her thumbs. She states that she do not ron anymore. She states that her mother's fingers would curl up from arthritis. She states that her mother could still use her hands and she did not complain about pain.     Discoloration in great big toe  The patient states that she has discolored toenails on her great big toe. She describes the pain as intermittent pain. She states that she has tried Vicks vapor rub because she was told that they will work. She states that she only used it because her  bought it. She states that she quit using it, which did not help.    Trouble sleeping  The patient states that she had had trouble sleeping.    Excessive eating habit  The patient states that she has been eating excessively.     Neck pain  The patient states that she has pain in her neck. She states that it may be the way she sleeps. She denies sleeping good because she is afraid that her neck is in pain. She states that she has tried Voltaren, which did not help. She states that she was taking Aleve, but she stopped taking it because her 's doctor told him that he should not take anything and she did the same. She admits that the  Aleve did not help. She denies ever being a needle person.     Exercises  The patient states that she walks all the time and run the circles in her house. She states that she does 10 miles everyday. She states that she just can not sit still all the time. She states that exercises does not help her lose weight. She states that her weight would normally be at 100 pounds.     Health maintenance  The patient is due for a bone density scan. At the age of 67, she is due for 1 more pneumonia vaccine. She is due for a Shingles vaccine. She states that she has had 3 COVID-19 vaccines. She states that she received her last COVID-19 vaccine on 12/21/2021. She states that she received her influenza vaccine on 11/01/2022 at [facility]. She states that her daughter is a surgical technician, and she is unsure if she would like for her to receive the 4th COVID-19 vaccine. She had her last mammogram on 01/29/2021.  The following portions of the patient's history were reviewed and   updated as appropriate: allergies, current medications, past family history, past medical history, past social history, past surgical history and problem list.     Compared to one year ago, the patient feels her physical   health is the same.    Compared to one year ago, the patient feels her mental   health is worse.    Recent Hospitalizations:  She was not admitted to the hospital during the last year.       Current Medical Providers:  Patient Care Team:  Robbie Koehler MD as PCP - General (Family Medicine)    No outpatient medications prior to visit.     No facility-administered medications prior to visit.       No opioid medication identified on active medication list. I have reviewed chart for other potential  high risk medication/s and harmful drug interactions in the elderly.          Aspirin is not on active medication list.  Aspirin use is not indicated based on review of current medical condition/s. Risk of harm outweighs potential benefits.   ".    Patient Active Problem List   Diagnosis   • Migraine without aura and without status migrainosus, not intractable   • Anxiety and depression   • Primary osteoarthritis involving multiple joints     Advance Care Planning  Advance Directive is not on file.  ACP discussion was held with the patient during this visit. Patient does not have an advance directive, information provided.          Objective      Vitals:    22 0953   BP: 128/78   BP Location: Right arm   Patient Position: Sitting   Cuff Size: Adult   Pulse: 64   Resp: 18   Temp: 97.3 °F (36.3 °C)   TempSrc: Temporal   SpO2: 97%   Weight: 64.4 kg (142 lb)   Height: 162 cm (63.78\")   PainSc: 0-No pain     Estimated body mass index is 24.54 kg/m² as calculated from the following:    Height as of this encounter: 162 cm (63.78\").    Weight as of this encounter: 64.4 kg (142 lb).    BMI is within normal parameters. No other follow-up for BMI required.      Does the patient have evidence of cognitive impairment? No    Physical Exam         Procedures       HEALTH RISK ASSESSMENT    Smoking Status:  Social History     Tobacco Use   Smoking Status Former   • Packs/day: 1.50   • Years: 40.00   • Pack years: 60.00   • Types: Cigarettes, Electronic Cigarette   • Start date: 1974   • Quit date: 10/15/2018   • Years since quittin.1   Smokeless Tobacco Never     Alcohol Consumption:  Social History     Substance and Sexual Activity   Alcohol Use Yes    Comment: 1 or 2 ready to drink margaritas 5 or 6 times a week       Fall Risk Screen:    IVETTE Fall Risk Assessment was completed, and patient is at LOW risk for falls.Assessment completed on:2022    Depression Screen:   PHQ-2/PHQ-9 Depression Screening 2022   Retired PHQ-9 Total Score -   Retired Total Score -   Little Interest or Pleasure in Doing Things 3-->nearly every day   Feeling Down, Depressed or Hopeless 3-->nearly every day   Trouble Falling or Staying Asleep, or Sleeping Too Much " 3-->nearly every day   Feeling Tired or Having Little Energy 3-->nearly every day   Poor Appetite or Overeating 3-->nearly every day   Feeling Bad about Yourself - or that You are a Failure or Have Let Yourself or Your Family Down 1-->several days   Trouble Concentrating on Things, Such as Reading the Newspaper or Watching Television 0-->not at all   Moving or Speaking So Slowly that Other People Could Have Noticed? Or the Opposite - Being So Fidgety 0-->not at all   Thoughts that You Would be Better Off Dead or of Hurting Yourself in Some Way 0-->not at all   PHQ-9: Brief Depression Severity Measure Score 16   If You Checked Off Any Problems, How Difficult Have These Problems Made It For You to Do Your Work, Take Care of Things at Home, or Get Along with Other People? extremely difficult       Health Habits and Functional and Cognitive Screening:  Functional & Cognitive Status 12/6/2022   Do you have difficulty preparing food and eating? No   Do you have difficulty bathing yourself, getting dressed or grooming yourself? No   Do you have difficulty using the toilet? No   Do you have difficulty moving around from place to place? No   Do you have trouble with steps or getting out of a bed or a chair? No   Current Diet Well Balanced Diet   Dental Exam Up to date   Eye Exam Up to date   Exercise (times per week) 7 times per week   Current Exercises Include Walking   Do you need help using the phone?  No   Are you deaf or do you have serious difficulty hearing?  No   Do you need help with transportation? No   Do you need help shopping? No   Do you need help preparing meals?  No   Do you need help with housework?  No   Do you need help with laundry? No   Do you need help taking your medications? No   Do you need help managing money? No   Do you ever drive or ride in a car without wearing a seat belt? No   Have you felt unusual stress, anger or loneliness in the last month? Yes   Who do you live with? Child   If you need  help, do you have trouble finding someone available to you? No   Have you been bothered in the last four weeks by sexual problems? No   Do you have difficulty concentrating, remembering or making decisions? No       Visual Acuity:    No results found.    Age-appropriate Screening Schedule:  Refer to the list below for future screening recommendations based on patient's age, sex and/or medical conditions. Orders for these recommended tests are listed in the plan section. The patient has been provided with a written plan.    Health Maintenance   Topic Date Due   • DXA SCAN  Never done   • ZOSTER VACCINE (2 of 3) 02/26/2015   • MAMMOGRAM  01/29/2022   • LIPID PANEL  11/29/2022   • TDAP/TD VACCINES (3 - Td or Tdap) 01/01/2028   • INFLUENZA VACCINE  Completed   • PAP SMEAR  Discontinued          Assessment & Plan   CMS Preventative Services Quick Reference  Risk Factors Identified During Encounter  Depression/Dysphoria  The above risks/problems have been discussed with the patient.  Pertinent information has been shared with the patient in the After Visit Summary.  Follow up plans and orders are seen below in the Assessment/Plan Section.    Diagnoses and all orders for this visit:    1. Welcome to Medicare preventive visit (Primary)  -     Code Status and Medical Interventions:    2. Primary osteoarthritis involving multiple joints    3. Anxiety and depression    4. Encounter for vaccination  -     Pneumococcal Conjugate Vaccine 20-Valent All; Future  -     Pneumococcal Conjugate Vaccine 20-Valent All    5. Encounter for screening mammogram for malignant neoplasm of breast  -     Mammo Screening Digital Tomosynthesis Bilateral With CAD; Future    6. Screening for osteoporosis  -     DEXA Bone Density Axial; Future    7. Encounter for follow-up examination after completed treatment for conditions other than malignant neoplasm  -     DEXA Bone Density Axial; Future    8. Screening for lung cancer  -      CT Chest Low Dose  Cancer Screening WO; Future    9. Personal history of nicotine dependence  -      CT Chest Low Dose Cancer Screening WO; Future      1. Anxiety and depression.  - We discussed some additional resources that we have.  - We considered her meeting with one of our behavioral health specialists.  - We considered medications that would help with the symptoms.    2. Neck pain  - We discussed with the patient trying minimal interventions that could help with her symptoms overall.   - We advised the patient to try diclofenac gel.  - We advised that in the short term we could try NSAIDs so that will be like meloxicam, Aleve and ibuprofen.  - We considered that patient doing physical therapy and using a lidocaine patch.  - We considered talking to an interventional pain specialists.  - We considered other options besides specialists and medications.  - We advised the patient to try [facility], indoor pool to try water jogging with a float belt or swimming laps, yoga and pilates exercises.  - The patient was advised to send a message through [employer] is she reconsider in taking medications or seeing a specialist.     3. Health maintenance  - We discussed the immunizations that is due.  - We discussed the tests that will be performed and due.    The patient will follow-up in 3 months.  Follow Up:   Return in about 3 months (around 3/6/2023) for Recheck.     An After Visit Summary and PPPS were made available to the patient.        Transcribed from ambient dictation for Robbie Koehler MD by Kate Castellano.  12/06/22   14:05 EST    Patient or patient representative verbalized consent to the visit recording.  I have personally performed the services described in this document as transcribed by the above individual, and it is both accurate and complete.

## 2022-12-22 ENCOUNTER — HOSPITAL ENCOUNTER (OUTPATIENT)
Dept: CT IMAGING | Facility: HOSPITAL | Age: 67
Discharge: HOME | End: 2022-12-22
Admitting: STUDENT IN AN ORGANIZED HEALTH CARE EDUCATION/TRAINING PROGRAM

## 2022-12-22 DIAGNOSIS — Z87.891 PERSONAL HISTORY OF NICOTINE DEPENDENCE: ICD-10-CM

## 2022-12-22 DIAGNOSIS — Z12.2 SCREENING FOR LUNG CANCER: ICD-10-CM

## 2022-12-22 PROCEDURE — 71271 CT THORAX LUNG CANCER SCR C-: CPT

## 2022-12-23 PROBLEM — J43.8 OTHER EMPHYSEMA (HCC): Status: ACTIVE | Noted: 2022-12-23

## 2023-02-03 ENCOUNTER — HOSPITAL ENCOUNTER (OUTPATIENT)
Dept: BONE DENSITY | Facility: HOSPITAL | Age: 68
Discharge: HOME | End: 2023-02-03
Payer: MEDICARE

## 2023-02-03 ENCOUNTER — HOSPITAL ENCOUNTER (OUTPATIENT)
Dept: MAMMOGRAPHY | Facility: HOSPITAL | Age: 68
Discharge: HOME | End: 2023-02-03
Payer: MEDICARE

## 2023-02-03 DIAGNOSIS — Z12.31 ENCOUNTER FOR SCREENING MAMMOGRAM FOR MALIGNANT NEOPLASM OF BREAST: ICD-10-CM

## 2023-02-03 DIAGNOSIS — Z13.820 SCREENING FOR OSTEOPOROSIS: ICD-10-CM

## 2023-02-03 DIAGNOSIS — Z09 ENCOUNTER FOR FOLLOW-UP EXAMINATION AFTER COMPLETED TREATMENT FOR CONDITIONS OTHER THAN MALIGNANT NEOPLASM: ICD-10-CM

## 2023-02-03 PROCEDURE — 77063 BREAST TOMOSYNTHESIS BI: CPT | Performed by: RADIOLOGY

## 2023-02-03 PROCEDURE — 77067 SCR MAMMO BI INCL CAD: CPT

## 2023-02-03 PROCEDURE — 77080 DXA BONE DENSITY AXIAL: CPT

## 2023-02-03 PROCEDURE — 77067 SCR MAMMO BI INCL CAD: CPT | Performed by: RADIOLOGY

## 2023-02-03 PROCEDURE — 77063 BREAST TOMOSYNTHESIS BI: CPT

## 2023-02-07 PROBLEM — M85.89 OSTEOPENIA OF MULTIPLE SITES: Status: ACTIVE | Noted: 2023-02-07

## 2023-03-06 ENCOUNTER — OFFICE VISIT (OUTPATIENT)
Dept: INTERNAL MEDICINE | Facility: CLINIC | Age: 68
End: 2023-03-06
Payer: MEDICARE

## 2023-03-06 VITALS
WEIGHT: 143.6 LBS | BODY MASS INDEX: 24.82 KG/M2 | TEMPERATURE: 97.7 F | HEART RATE: 78 BPM | DIASTOLIC BLOOD PRESSURE: 66 MMHG | RESPIRATION RATE: 18 BRPM | SYSTOLIC BLOOD PRESSURE: 124 MMHG

## 2023-03-06 DIAGNOSIS — E78.2 MIXED HYPERLIPIDEMIA: ICD-10-CM

## 2023-03-06 DIAGNOSIS — J43.8 OTHER EMPHYSEMA: ICD-10-CM

## 2023-03-06 DIAGNOSIS — F32.A ANXIETY AND DEPRESSION: Primary | ICD-10-CM

## 2023-03-06 DIAGNOSIS — F41.9 ANXIETY AND DEPRESSION: Primary | ICD-10-CM

## 2023-03-06 LAB
CHOLEST SERPL-MCNC: 194 MG/DL (ref 0–200)
HDLC SERPL-MCNC: 75 MG/DL (ref 40–60)
LDLC SERPL CALC-MCNC: 107 MG/DL (ref 0–100)
LDLC/HDLC SERPL: 1.42 {RATIO}
TRIGL SERPL-MCNC: 64 MG/DL (ref 0–150)
VLDLC SERPL-MCNC: 12 MG/DL (ref 5–40)

## 2023-03-06 PROCEDURE — 36415 COLL VENOUS BLD VENIPUNCTURE: CPT | Performed by: STUDENT IN AN ORGANIZED HEALTH CARE EDUCATION/TRAINING PROGRAM

## 2023-03-06 PROCEDURE — 80061 LIPID PANEL: CPT | Performed by: STUDENT IN AN ORGANIZED HEALTH CARE EDUCATION/TRAINING PROGRAM

## 2023-03-06 PROCEDURE — 99214 OFFICE O/P EST MOD 30 MIN: CPT | Performed by: STUDENT IN AN ORGANIZED HEALTH CARE EDUCATION/TRAINING PROGRAM

## 2023-03-06 NOTE — ASSESSMENT & PLAN NOTE
- Patient will have lipid panel collected today.  - Patient will return to the clinic in 12/2023 for Medicare Annual Wellness Visit.

## 2023-03-06 NOTE — PROGRESS NOTES
Follow Up Office Visit      Date: 2023   Patient Name: Kim Watson  : 1955   MRN: [id number]     Chief Complaint:    Chief Complaint   Patient presents with   • Anxiety   • Depression     fu       History of Present Illness: Kim Watson is a 67 y.o. female who is here today to discuss anxiety and depression.       Anxiety and depression  Ms. Watson reports that her anxiety and depression are not well controlled at this time. She has recently experienced anniversaries of deaths in her family. She is unsure if she is able to enjoy things as she once did. The patient denies thoughts of hurting herself or anyone else. She does not wish to proceed forward with receiving additional resources at this time. She states she watched her mother take approximately 15 to 20 pills daily and does not want to reach a similar point. The patient does have a good support system through her daughter, son, and sister. She enjoys caring for her 2 dogs.    Mixed hyperlipidemia   The patient denies taking any daily medication or supplements.    She has not yet decided if she wishes to receive a COVID-19 vaccination.     The 10-year ASCVD risk score (Angelic HERNANDEZ, et al., 2019) is: 4.5%    Values used to calculate the score:      Age: 67 years      Sex: Female      Is Non- : No      Diabetic: No      Tobacco smoker: No      Systolic Blood Pressure: 124 mmHg      Is BP treated: No      HDL Cholesterol: 75 mg/dL      Total Cholesterol: 194 mg/dL        Subjective      Review of Systems:   Review of Systems   Constitutional: Negative for activity change, appetite change, fatigue and fever.   Eyes: Negative for blurred vision, photophobia and visual disturbance.   Respiratory: Negative for cough, chest tightness and shortness of breath.    Cardiovascular: Negative for chest pain and palpitations.   Gastrointestinal: Negative for abdominal distention, abdominal pain, blood in stool,  constipation, diarrhea, nausea and vomiting.   Genitourinary: Negative for dysuria and hematuria.   Musculoskeletal: Negative for arthralgias, back pain and joint swelling.   Skin: Negative for rash and wound.   Neurological: Negative for weakness, headache and confusion.       I have reviewed the patients family history, social history, past medical history, past surgical history and have updated it as appropriate.     Medications:   No current outpatient medications on file.    Allergies:   Allergies   Allergen Reactions   • Morphine And Related GI Intolerance     SEVERE   • Sulfa Antibiotics GI Intolerance     SEVERE       Objective     Physical Exam: Please see above  Vital Signs:   Vitals:    03/06/23 1122   BP: 124/66   BP Location: Right arm   Patient Position: Sitting   Cuff Size: Adult   Pulse: 78   Resp: 18   Temp: 97.7 °F (36.5 °C)   TempSrc: Temporal   Weight: 65.1 kg (143 lb 9.6 oz)   PainSc: 0-No pain     Body mass index is 24.82 kg/m².  BMI is within normal parameters. No other follow-up for BMI required.       Physical Exam  Vitals and nursing note reviewed.   Constitutional:       General: She is not in acute distress.     Appearance: Normal appearance. She is normal weight. She is not ill-appearing or toxic-appearing.   HENT:      Nose: No congestion or rhinorrhea.   Eyes:      General:         Right eye: No discharge.         Left eye: No discharge.      Conjunctiva/sclera: Conjunctivae normal.   Pulmonary:      Effort: Pulmonary effort is normal. No respiratory distress.   Abdominal:      General: Abdomen is flat. There is no distension.   Musculoskeletal:      Cervical back: Normal range of motion.   Skin:     Coloration: Skin is not jaundiced.      Findings: No rash.   Neurological:      General: No focal deficit present.      Mental Status: She is alert. Mental status is at baseline.      Coordination: Coordination normal.      Gait: Gait normal.   Psychiatric:         Mood and Affect: Mood  normal.         Behavior: Behavior normal.         Thought Content: Thought content normal.         Judgment: Judgment normal.         Procedures    Results:   Labs:   TSH   Date Value Ref Range Status   11/04/2019 2.100 0.270 - 4.200 uIU/mL Final        Imaging:   No valid procedures specified.       The patient's DEXA scan revealed that her risk factor for a major fracture is 12%, and her risk for a hip fracture was 2.1%. The patient's mammography was normal. Her CT scan did not show evidence of cancer, but it did show mild emphysema.     Assessment / Plan      Assessment/Plan:   Problem List Items Addressed This Visit        Cardiac and Vasculature    Mixed hyperlipidemia    Current Assessment & Plan     - Patient will have lipid panel collected today.  - Patient will return to the clinic in 12/2023 for Medicare Annual Wellness Visit.           Relevant Orders    Lipid Panel (Completed)       Mental Health    Anxiety and depression - Primary    Current Assessment & Plan     - Patient wishes to hold off on additional resources at this time.  - Patient will return to the clinic in 12/2023 for Medicare Annual Wellness Visit.            Pulmonary and Pneumonias    Other emphysema (HCC)    Current Assessment & Plan     - Declined additional intervention at this time  - Consider LABA/LAMA with worsening symptoms              Follow Up:   Return in about 41 weeks (around 12/18/2023) for Medicare Wellness.          Robbie Koehler MD  [facility] Derrell Flanagan    Transcribed from ambient dictation for Robbie Koehler MD by Kayleigh Reyes.  03/06/23   12:35 EST    Patient or patient representative verbalized consent to the visit recording.  I have personally performed the services described in this document as transcribed by the above individual, and it is both accurate and complete.

## 2023-03-06 NOTE — ASSESSMENT & PLAN NOTE
- Patient wishes to hold off on additional resources at this time.  - Patient will return to the clinic in 12/2023 for Medicare Annual Wellness Visit.

## 2023-12-07 ENCOUNTER — OFFICE VISIT (OUTPATIENT)
Dept: INTERNAL MEDICINE | Facility: CLINIC | Age: 68
End: 2023-12-07
Payer: MEDICARE

## 2023-12-07 VITALS
TEMPERATURE: 97.3 F | BODY MASS INDEX: 24.11 KG/M2 | SYSTOLIC BLOOD PRESSURE: 130 MMHG | DIASTOLIC BLOOD PRESSURE: 72 MMHG | HEART RATE: 74 BPM | HEIGHT: 64 IN | RESPIRATION RATE: 16 BRPM | WEIGHT: 141.2 LBS

## 2023-12-07 DIAGNOSIS — F41.9 ANXIETY AND DEPRESSION: ICD-10-CM

## 2023-12-07 DIAGNOSIS — F32.A ANXIETY AND DEPRESSION: ICD-10-CM

## 2023-12-07 DIAGNOSIS — Z12.2 ENCOUNTER FOR SCREENING FOR LUNG CANCER: ICD-10-CM

## 2023-12-07 DIAGNOSIS — M15.9 PRIMARY OSTEOARTHRITIS INVOLVING MULTIPLE JOINTS: ICD-10-CM

## 2023-12-07 DIAGNOSIS — Z00.00 MEDICARE ANNUAL WELLNESS VISIT, INITIAL: Primary | ICD-10-CM

## 2023-12-07 DIAGNOSIS — F51.01 PRIMARY INSOMNIA: ICD-10-CM

## 2023-12-07 DIAGNOSIS — Z23 ENCOUNTER FOR ADMINISTRATION OF VACCINE: ICD-10-CM

## 2023-12-07 DIAGNOSIS — Z23 ENCOUNTER FOR VACCINATION: ICD-10-CM

## 2023-12-07 DIAGNOSIS — E78.2 MIXED HYPERLIPIDEMIA: ICD-10-CM

## 2023-12-07 DIAGNOSIS — Z87.891 PERSONAL HISTORY OF NICOTINE DEPENDENCE: ICD-10-CM

## 2023-12-07 LAB
ALBUMIN SERPL-MCNC: 4.5 G/DL (ref 3.5–5.2)
ALBUMIN/GLOB SERPL: 1.7 G/DL
ALP SERPL-CCNC: 66 U/L (ref 39–117)
ALT SERPL W P-5'-P-CCNC: 9 U/L (ref 1–33)
ANION GAP SERPL CALCULATED.3IONS-SCNC: 9.3 MMOL/L (ref 5–15)
AST SERPL-CCNC: 20 U/L (ref 1–32)
BILIRUB SERPL-MCNC: 0.3 MG/DL (ref 0–1.2)
BUN SERPL-MCNC: 15 MG/DL (ref 8–23)
BUN/CREAT SERPL: 18.1 (ref 7–25)
CALCIUM SPEC-SCNC: 9.5 MG/DL (ref 8.6–10.5)
CHLORIDE SERPL-SCNC: 105 MMOL/L (ref 98–107)
CHOLEST SERPL-MCNC: 186 MG/DL (ref 0–200)
CO2 SERPL-SCNC: 25.7 MMOL/L (ref 22–29)
CREAT SERPL-MCNC: 0.83 MG/DL (ref 0.57–1)
EGFRCR SERPLBLD CKD-EPI 2021: 77.9 ML/MIN/1.73
GLOBULIN UR ELPH-MCNC: 2.7 GM/DL
GLUCOSE SERPL-MCNC: 90 MG/DL (ref 65–99)
HDLC SERPL-MCNC: 67 MG/DL (ref 40–60)
LDLC SERPL CALC-MCNC: 107 MG/DL (ref 0–100)
LDLC/HDLC SERPL: 1.59 {RATIO}
POTASSIUM SERPL-SCNC: 4.3 MMOL/L (ref 3.5–5.2)
PROT SERPL-MCNC: 7.2 G/DL (ref 6–8.5)
SODIUM SERPL-SCNC: 140 MMOL/L (ref 136–145)
TRIGL SERPL-MCNC: 64 MG/DL (ref 0–150)
VLDLC SERPL-MCNC: 12 MG/DL (ref 5–40)

## 2023-12-07 PROCEDURE — 80061 LIPID PANEL: CPT | Performed by: STUDENT IN AN ORGANIZED HEALTH CARE EDUCATION/TRAINING PROGRAM

## 2023-12-07 PROCEDURE — 80053 COMPREHEN METABOLIC PANEL: CPT | Performed by: STUDENT IN AN ORGANIZED HEALTH CARE EDUCATION/TRAINING PROGRAM

## 2023-12-07 NOTE — PROGRESS NOTES
I have reviewed the notes, assessments, and/or procedures performed by Alesia Slade, I concur with her/his documentation of Kim Watson.

## 2023-12-07 NOTE — PROGRESS NOTES
The ABCs of the Annual Wellness Visit  Initial Medicare Wellness Visit    Chief Complaint   Patient presents with    Medicare Wellness-subsequent     Subjective   History of Present Illness:  Kim Watson is a 68 y.o. female who presents for an Initial Medicare Wellness Visit.      Anxiety/Depression  Patient states her anxiety has improved and that she is doing better. She states she has moments where she gets sad but it does not effect her enjoying things that she like to do. She denies feeling down and depressed. She states that she still does not want treatment.     Insomnia  Patient states she is not sleeping well. She reports getting 5 hours of sleep a night. She states she has not taken any over the counter medications for this and is not interested in receiving treatment at this time. She states she is not fatigued during the day and is functioning the same.    Arthritis in hands  Patient states she believes she has arthritis in her hands. She reports that her thumbs are sore some days and she has trouble gripping and closing her hand when this pain is present. She states she has tried volteran gel for this pain and it did not help. She states she is able to accomplish her normal daily tasks even with this pain present and states she does not desire to see a specialist or take additional medications for this issue.    Health Maintenance  Patient inquires about the frequency of her mammogram. She states she does not desire to obtain these yearly because they make her feel uncomfortable.       The following portions of the patient's history were reviewed and   updated as appropriate: allergies, current medications, past family history, past medical history, past social history, past surgical history, and problem list.     Compared to one year ago, the patient feels her physical   health is the same.    Compared to one year ago, the patient feels her mental   health is better.    Recent  "Hospitalizations:  She was not admitted to the hospital during the last year.       Current Medical Providers:  Patient Care Team:  Robbie Koehler MD as PCP - General (Family Medicine)    No outpatient medications prior to visit.     No facility-administered medications prior to visit.       No opioid medication identified on active medication list. I have reviewed chart for other potential  high risk medication/s and harmful drug interactions in the elderly.        Aspirin is not on active medication list.  Aspirin use is not indicated based on review of current medical condition/s. Risk of harm outweighs potential benefits.  .    Patient Active Problem List   Diagnosis    Migraine without aura and without status migrainosus, not intractable    Anxiety and depression    Primary osteoarthritis involving multiple joints    Other emphysema    Osteopenia of multiple sites    Mixed hyperlipidemia    Primary insomnia     Advance Care Planning  Advance Directive is not on file.  ACP discussion was held with the patient during this visit. Patient has an advance directive (not in EMR), copy requested.    Review of Systems   Constitutional:  Negative for appetite change and fatigue.   Respiratory:  Negative for shortness of breath.    Cardiovascular:  Negative for chest pain and leg swelling.   Gastrointestinal:  Positive for constipation. Negative for abdominal pain and diarrhea.   Genitourinary:  Negative for frequency and urgency.   Musculoskeletal:  Positive for arthralgias.   Neurological:  Negative for dizziness and light-headedness.   Psychiatric/Behavioral:  The patient is not nervous/anxious.         Objective       Vitals:    12/07/23 0955   BP: 130/72   BP Location: Right arm   Patient Position: Sitting   Cuff Size: Adult   Pulse: 74   Resp: 16   Temp: 97.3 °F (36.3 °C)   TempSrc: Temporal   Weight: 64 kg (141 lb 3.2 oz)   Height: 162 cm (63.78\")   PainSc: 0-No pain     Estimated body mass index is 24.4 kg/m² as " "calculated from the following:    Height as of this encounter: 162 cm (63.78\").    Weight as of this encounter: 64 kg (141 lb 3.2 oz).    BMI is within normal parameters. No other follow-up for BMI required.      Does the patient have evidence of cognitive impairment? No    Physical Exam  Constitutional:       General: She is not in acute distress.     Appearance: Normal appearance. She is not ill-appearing.   HENT:      Head: Normocephalic.   Eyes:      General:         Right eye: No discharge.         Left eye: No discharge.   Cardiovascular:      Rate and Rhythm: Normal rate.      Heart sounds: Normal heart sounds, S1 normal and S2 normal. No murmur heard.     No friction rub.   Pulmonary:      Effort: Pulmonary effort is normal. No accessory muscle usage or respiratory distress.      Breath sounds: Normal breath sounds. No wheezing, rhonchi or rales.   Skin:     General: Skin is warm and dry.   Neurological:      Mental Status: She is alert.   Psychiatric:         Attention and Perception: Attention normal.         Mood and Affect: Mood normal.         Speech: Speech normal.         Behavior: Behavior is cooperative.               HEALTH RISK ASSESSMENT    Smoking Status:  Social History     Tobacco Use   Smoking Status Former    Packs/day: 1.50    Years: 40.00    Additional pack years: 0.00    Total pack years: 60.00    Types: Cigarettes, Electronic Cigarette    Start date: 1974    Quit date: 10/15/2018    Years since quittin.1   Smokeless Tobacco Never     Alcohol Consumption:  Social History     Substance and Sexual Activity   Alcohol Use Yes    Comment: 1 or 2 ready to drink margaritas 5 or 6 times a week     Fall Risk Screen:    IVETTE Fall Risk Assessment was completed, and patient is at LOW risk for falls.Assessment completed on:2023    Depression Screen:       2023     9:53 AM   PHQ-2/PHQ-9 Depression Screening   Little Interest or Pleasure in Doing Things 0-->not at all   Feeling Down, " Depressed or Hopeless 0-->not at all   PHQ-9: Brief Depression Severity Measure Score 0       Health Habits and Functional and Cognitive Screenin/6/2022     9:56 AM   Functional & Cognitive Status   Do you have difficulty preparing food and eating? No   Do you have difficulty bathing yourself, getting dressed or grooming yourself? No   Do you have difficulty using the toilet? No   Do you have difficulty moving around from place to place? No   Do you have trouble with steps or getting out of a bed or a chair? No   Current Diet Well Balanced Diet   Dental Exam Up to date   Eye Exam Up to date   Exercise (times per week) 7 times per week   Current Exercises Include Walking   Do you need help using the phone?  No   Are you deaf or do you have serious difficulty hearing?  No   Do you need help to go to places out of walking distance? No   Do you need help shopping? No   Do you need help preparing meals?  No   Do you need help with housework?  No   Do you need help with laundry? No   Do you need help taking your medications? No   Do you need help managing money? No   Do you ever drive or ride in a car without wearing a seat belt? No   Have you felt unusual stress, anger or loneliness in the last month? Yes   Who do you live with? Child   If you need help, do you have trouble finding someone available to you? No   Have you been bothered in the last four weeks by sexual problems? No   Do you have difficulty concentrating, remembering or making decisions? No       Age-appropriate Screening Schedule:  Refer to the list below for future screening recommendations based on patient's age, sex and/or medical conditions. Orders for these recommended tests are listed in the plan section. The patient has been provided with a written plan.    Health Maintenance   Topic Date Due    ZOSTER VACCINE (2 of 3) 2015    COVID-19 Vaccine (2023- season) 2023    LUNG CANCER SCREENING  2023    MAMMOGRAM   02/03/2024    LIPID PANEL  03/06/2024    ANNUAL WELLNESS VISIT  12/07/2024    DXA SCAN  02/03/2025    TDAP/TD VACCINES (3 - Td or Tdap) 01/01/2028    COLORECTAL CANCER SCREENING  04/17/2028    HEPATITIS C SCREENING  Completed    INFLUENZA VACCINE  Completed    Pneumococcal Vaccine 65+  Completed    PAP SMEAR  Discontinued            Assessment & Plan   CMS Preventative Services Quick Reference  Risk Factors Identified During Encounter  Dental Screening Recommended  Vision Screening Recommended  The above risks/problems have been discussed with the patient.  Follow up actions/plans if indicated are seen below in the Assessment/Plan Section.  Pertinent information has been shared with the patient in the After Visit Summary.    Diagnoses and all orders for this visit:    1. Medicare annual wellness visit, initial (Primary)  -     Code Status and Medical Interventions:    2. Encounter for vaccination  -     RSVPreF3 Vac Recomb Adjuvanted (AREXVY) 120 MCG/0.5ML reconstituted suspension injection; Inject 0.5 mL into the appropriate muscle as directed by prescriber 1 (One) Time for 1 dose.  Dispense: 0.5 mL; Refill: 0  -     COVID-19 mRNA Vaccine, Moderna, (Spikevax COVID-19 Vaccine) 100 MCG/0.5ML suspension; Inject 0.25 mL into the appropriate muscle as directed by prescriber 1 (One) Time for 1 dose.  Dispense: 0.25 mL; Refill: 0  -     Zoster Vac Recomb Adjuvanted 50 MCG/0.5ML reconstituted suspension; Inject 0.5 mL into the appropriate muscle as directed by prescriber 1 (One) Time for 1 dose.  Dispense: 1 each; Refill: 0  -     Fluzone High-Dose 65+yrs; Future  -     Fluzone High-Dose 65+yrs    3. Mixed hyperlipidemia  -     Lipid panel; Future  -     Comprehensive metabolic panel; Future    4. Encounter for administration of vaccine    5. Anxiety and depression    6. Primary osteoarthritis involving multiple joints    7. Primary insomnia    8. Encounter for screening for lung cancer  -      CT Chest Low Dose Cancer  Screening WO; Future    9. Personal history of nicotine dependence  -      CT Chest Low Dose Cancer Screening WO; Future    1. Medicare annual wellness visit, initial (Primary)  Advance care planning discussed with patient and up to date. Lipid panel and CMP ordered. Lung cancer screening ordered for 60 pack year smoking history.    2. Encounter for vaccination  Flu, shingles, and RSV vaccine ordered for patient. She will receive the flu vaccine in clinic and the others will be sent to her pharmacy. Patient also educated on importance of vaccines to prevent disease.    3. Mixed hyperlipidemia  Lipid panel ordered to follow cholesterol levels.    4. Anxiety and depression  Discussed with patient her current symptoms for anxiety and depression. She reports feeling better and continues to deny treatment with therapy and/or medications. She was encouraged to reach out if symptoms return or if she desires treatment.    5. Primary osteoarthritis involving multiple joints  Discussed with patient, she states the pain in her hands comes and goes. She reports she has tried voltaren gel and that it is ineffective in improving this pain. She has been educated on treatment options for this pain and denies treatment at this time. She was encouraged to reach out if these symptoms worsen or if she desires treatment.    6. Primary insomnia  Educated patient on sleep hygiene and medication options to improve sleep. She states that since her lack of sleep is not effecting her daily tasks she will decline treatment at this time.         Follow Up:  Return in about 1 year (around 12/7/2024) for Medicare Wellness.     An After Visit Summary and PPPS were made available to the patient.         EDSON Bravo Student  12/07/2023

## 2023-12-07 NOTE — PROGRESS NOTES
"The ABCs of the Annual Wellness Visit  Initial Medicare Wellness Visit    Chief Complaint   Patient presents with    Medicare Wellness-subsequent     Subjective   History of Present Illness:  Kim Watson is a 68 y.o. female who presents for an Initial Medicare Wellness Visit.  ***DAXHPI      Mammo   Lung cancer screening  Lipid   vaccines  The following portions of the patient's history were reviewed and   updated as appropriate: {history reviewed:20406::\"allergies\",\"current medications\",\"past family history\",\"past medical history\",\"past social history\",\"past surgical history\",\"problem list\"}.     Compared to one year ago, the patient feels her physical   health is {better worse same:00682}.    Compared to one year ago, the patient feels her mental   health is {better worse same:79929}.    Recent Hospitalizations:  {Hospital Admission Status in the last 365 days:67316}      Current Medical Providers:  Patient Care Team:  Robbie Koehler MD as PCP - General (Family Medicine)    No outpatient medications prior to visit.     No facility-administered medications prior to visit.       No opioid medication identified on active medication list. I have reviewed chart for other potential  high risk medication/s and harmful drug interactions in the elderly.        Aspirin is not on active medication list.  {ASPIRIN NOT ON MEDICATION LIST INDICATED/NOT INDICATED:93714}.    Patient Active Problem List   Diagnosis    Migraine without aura and without status migrainosus, not intractable    Anxiety and depression    Primary osteoarthritis involving multiple joints    Other emphysema    Osteopenia of multiple sites    Mixed hyperlipidemia    Primary insomnia     Advance Care Planning  Advance Directive is not on file.  {ACP Discussion, Advance Directive not in EMR:70913}          Objective       Vitals:    12/07/23 0955   BP: 130/72   BP Location: Right arm   Patient Position: Sitting   Cuff Size: Adult   Pulse: 74   Resp: " "16   Temp: 97.3 °F (36.3 °C)   TempSrc: Temporal   Weight: 64 kg (141 lb 3.2 oz)   Height: 162 cm (63.78\")   PainSc: 0-No pain     Estimated body mass index is 24.4 kg/m² as calculated from the following:    Height as of this encounter: 162 cm (63.78\").    Weight as of this encounter: 64 kg (141 lb 3.2 oz).    BMI is within normal parameters. No other follow-up for BMI required.      Does the patient have evidence of cognitive impairment? {Yes/No:20142}    Physical Exam          HEALTH RISK ASSESSMENT    Smoking Status:  Social History     Tobacco Use   Smoking Status Former    Packs/day: 1.50    Years: 40.00    Additional pack years: 0.00    Total pack years: 60.00    Types: Cigarettes, Electronic Cigarette    Start date: 1974    Quit date: 10/15/2018    Years since quittin.1   Smokeless Tobacco Never     Alcohol Consumption:  Social History     Substance and Sexual Activity   Alcohol Use Yes    Comment: 1 or 2 ready to drink margaritas 5 or 6 times a week     Fall Risk Screen:    IVETTE Fall Risk Assessment was completed, and patient is at LOW risk for falls.Assessment completed on:2023    Depression Screen:       2023     9:53 AM   PHQ-2/PHQ-9 Depression Screening   Little Interest or Pleasure in Doing Things 0-->not at all   Feeling Down, Depressed or Hopeless 0-->not at all   PHQ-9: Brief Depression Severity Measure Score 0       Health Habits and Functional and Cognitive Screenin/6/2022     9:56 AM   Functional & Cognitive Status   Do you have difficulty preparing food and eating? No   Do you have difficulty bathing yourself, getting dressed or grooming yourself? No   Do you have difficulty using the toilet? No   Do you have difficulty moving around from place to place? No   Do you have trouble with steps or getting out of a bed or a chair? No   Current Diet Well Balanced Diet   Dental Exam Up to date   Eye Exam Up to date   Exercise (times per week) 7 times per week   Current " Exercises Include Walking   Do you need help using the phone?  No   Are you deaf or do you have serious difficulty hearing?  No   Do you need help to go to places out of walking distance? No   Do you need help shopping? No   Do you need help preparing meals?  No   Do you need help with housework?  No   Do you need help with laundry? No   Do you need help taking your medications? No   Do you need help managing money? No   Do you ever drive or ride in a car without wearing a seat belt? No   Have you felt unusual stress, anger or loneliness in the last month? Yes   Who do you live with? Child   If you need help, do you have trouble finding someone available to you? No   Have you been bothered in the last four weeks by sexual problems? No   Do you have difficulty concentrating, remembering or making decisions? No       Age-appropriate Screening Schedule:  Refer to the list below for future screening recommendations based on patient's age, sex and/or medical conditions. Orders for these recommended tests are listed in the plan section. The patient has been provided with a written plan.    Health Maintenance   Topic Date Due    ZOSTER VACCINE (2 of 3) 02/26/2015    INFLUENZA VACCINE  08/01/2023    COVID-19 Vaccine (4 - 2023-24 season) 09/01/2023    LUNG CANCER SCREENING  12/22/2023    MAMMOGRAM  02/03/2024    LIPID PANEL  03/06/2024    ANNUAL WELLNESS VISIT  12/07/2024    DXA SCAN  02/03/2025    TDAP/TD VACCINES (3 - Td or Tdap) 01/01/2028    COLORECTAL CANCER SCREENING  04/17/2028    HEPATITIS C SCREENING  Completed    Pneumococcal Vaccine 65+  Completed    PAP SMEAR  Discontinued            Assessment & Plan   CMS Preventative Services Quick Reference  Risk Factors Identified During Encounter  {Medicare Wellness Risk Factors:36958}  The above risks/problems have been discussed with the patient.  Follow up actions/plans if indicated are seen below in the Assessment/Plan Section.  Pertinent information has been shared  with the patient in the After Visit Summary.    Diagnoses and all orders for this visit:    1. Medicare annual wellness visit, initial (Primary)    2. Encounter for vaccination  -     RSVPreF3 Vac Recomb Adjuvanted (AREXVY) 120 MCG/0.5ML reconstituted suspension injection; Inject 0.5 mL into the appropriate muscle as directed by prescriber 1 (One) Time for 1 dose.  Dispense: 0.5 mL; Refill: 0  -     COVID-19 mRNA Vaccine, Moderna, (Spikevax COVID-19 Vaccine) 100 MCG/0.5ML suspension; Inject 0.25 mL into the appropriate muscle as directed by prescriber 1 (One) Time for 1 dose.  Dispense: 0.25 mL; Refill: 0  -     Zoster Vac Recomb Adjuvanted 50 MCG/0.5ML reconstituted suspension; Inject 0.5 mL into the appropriate muscle as directed by prescriber 1 (One) Time for 1 dose.  Dispense: 1 each; Refill: 0  -     Fluzone High-Dose 65+yrs; Future    3. Mixed hyperlipidemia  -     Lipid panel; Future  -     Comprehensive metabolic panel; Future    4. Encounter for administration of vaccine    5. Anxiety and depression    6. Primary osteoarthritis involving multiple joints    7. Primary insomnia    8. Encounter for screening for lung cancer  -      CT Chest Low Dose Cancer Screening WO; Future    9. Personal history of nicotine dependence  -      CT Chest Low Dose Cancer Screening WO; Future      ***DAXAP  ***DAXRESULTS  Follow Up:  Return in about 1 year (around 12/7/2024) for Medicare Wellness.     An After Visit Summary and PPPS were made available to the patient.

## 2024-03-08 ENCOUNTER — APPOINTMENT (OUTPATIENT)
Dept: GENERAL RADIOLOGY | Facility: HOSPITAL | Age: 69
End: 2024-03-08
Payer: MEDICARE

## 2024-03-08 ENCOUNTER — APPOINTMENT (OUTPATIENT)
Dept: CARDIOLOGY | Facility: HOSPITAL | Age: 69
End: 2024-03-08
Payer: MEDICARE

## 2024-03-08 ENCOUNTER — OFFICE VISIT (OUTPATIENT)
Dept: INTERNAL MEDICINE | Facility: CLINIC | Age: 69
End: 2024-03-08
Payer: MEDICARE

## 2024-03-08 ENCOUNTER — HOSPITAL ENCOUNTER (OUTPATIENT)
Facility: HOSPITAL | Age: 69
Discharge: HOME | End: 2024-03-08
Attending: EMERGENCY MEDICINE | Admitting: INTERNAL MEDICINE
Payer: MEDICARE

## 2024-03-08 VITALS
HEIGHT: 64 IN | DIASTOLIC BLOOD PRESSURE: 80 MMHG | TEMPERATURE: 96.8 F | RESPIRATION RATE: 20 BRPM | OXYGEN SATURATION: 96 % | BODY MASS INDEX: 24.59 KG/M2 | SYSTOLIC BLOOD PRESSURE: 118 MMHG | HEART RATE: 92 BPM | WEIGHT: 144 LBS

## 2024-03-08 VITALS
RESPIRATION RATE: 18 BRPM | TEMPERATURE: 98.7 F | DIASTOLIC BLOOD PRESSURE: 76 MMHG | HEART RATE: 109 BPM | OXYGEN SATURATION: 95 % | WEIGHT: 143 LBS | SYSTOLIC BLOOD PRESSURE: 125 MMHG | HEIGHT: 62 IN | BODY MASS INDEX: 26.31 KG/M2

## 2024-03-08 DIAGNOSIS — I48.92 ATRIAL FLUTTER WITH RAPID VENTRICULAR RESPONSE: Primary | ICD-10-CM

## 2024-03-08 DIAGNOSIS — I48.91 NEW ONSET A-FIB: Primary | ICD-10-CM

## 2024-03-08 PROBLEM — Z86.19 HISTORY OF SCARLET FEVER: Status: ACTIVE | Noted: 2024-03-08

## 2024-03-08 PROBLEM — I34.0 MITRAL REGURGITATION: Status: ACTIVE | Noted: 2024-03-08

## 2024-03-08 LAB
ALBUMIN SERPL-MCNC: 4.2 G/DL (ref 3.5–5.2)
ALBUMIN/GLOB SERPL: 1.4 G/DL
ALP SERPL-CCNC: 79 U/L (ref 39–117)
ALT SERPL W P-5'-P-CCNC: 25 U/L (ref 1–33)
ANION GAP SERPL CALCULATED.3IONS-SCNC: 11 MMOL/L (ref 5–15)
AST SERPL-CCNC: 27 U/L (ref 1–32)
BASOPHILS # BLD AUTO: 0.05 10*3/MM3 (ref 0–0.2)
BASOPHILS NFR BLD AUTO: 0.8 % (ref 0–1.5)
BILIRUB SERPL-MCNC: 0.6 MG/DL (ref 0–1.2)
BUN SERPL-MCNC: 12 MG/DL (ref 8–23)
BUN/CREAT SERPL: 14.5 (ref 7–25)
CALCIUM SPEC-SCNC: 9.2 MG/DL (ref 8.6–10.5)
CHLORIDE SERPL-SCNC: 105 MMOL/L (ref 98–107)
CO2 SERPL-SCNC: 20 MMOL/L (ref 22–29)
CREAT SERPL-MCNC: 0.83 MG/DL (ref 0.57–1)
DEPRECATED RDW RBC AUTO: 47.6 FL (ref 37–54)
EGFRCR SERPLBLD CKD-EPI 2021: 77.9 ML/MIN/1.73
EOSINOPHIL # BLD AUTO: 0.12 10*3/MM3 (ref 0–0.4)
EOSINOPHIL NFR BLD AUTO: 1.8 % (ref 0.3–6.2)
ERYTHROCYTE [DISTWIDTH] IN BLOOD BY AUTOMATED COUNT: 13.5 % (ref 12.3–15.4)
GLOBULIN UR ELPH-MCNC: 3 GM/DL
GLUCOSE SERPL-MCNC: 110 MG/DL (ref 65–99)
HCT VFR BLD AUTO: 43.5 % (ref 34–46.6)
HGB BLD-MCNC: 14.3 G/DL (ref 12–15.9)
HOLD SPECIMEN: NORMAL
IMM GRANULOCYTES # BLD AUTO: 0.02 10*3/MM3 (ref 0–0.05)
IMM GRANULOCYTES NFR BLD AUTO: 0.3 % (ref 0–0.5)
INR PPP: 0.98 (ref 0.89–1.12)
LYMPHOCYTES # BLD AUTO: 1.56 10*3/MM3 (ref 0.7–3.1)
LYMPHOCYTES NFR BLD AUTO: 23.5 % (ref 19.6–45.3)
MAGNESIUM SERPL-MCNC: 2.1 MG/DL (ref 1.6–2.4)
MCH RBC QN AUTO: 31.3 PG (ref 26.6–33)
MCHC RBC AUTO-ENTMCNC: 32.9 G/DL (ref 31.5–35.7)
MCV RBC AUTO: 95.2 FL (ref 79–97)
MONOCYTES # BLD AUTO: 0.64 10*3/MM3 (ref 0.1–0.9)
MONOCYTES NFR BLD AUTO: 9.7 % (ref 5–12)
NEUTROPHILS NFR BLD AUTO: 4.24 10*3/MM3 (ref 1.7–7)
NEUTROPHILS NFR BLD AUTO: 63.9 % (ref 42.7–76)
NRBC BLD AUTO-RTO: 0 /100 WBC (ref 0–0.2)
NT-PROBNP SERPL-MCNC: 3132 PG/ML (ref 0–900)
PLATELET # BLD AUTO: 289 10*3/MM3 (ref 140–450)
PMV BLD AUTO: 11 FL (ref 6–12)
POTASSIUM SERPL-SCNC: 4.1 MMOL/L (ref 3.5–5.2)
PROT SERPL-MCNC: 7.2 G/DL (ref 6–8.5)
PROTHROMBIN TIME: 13.1 SECONDS (ref 12.2–14.5)
QT INTERVAL: 278 MS
QTC INTERVAL: 391 MS
RBC # BLD AUTO: 4.57 10*6/MM3 (ref 3.77–5.28)
SODIUM SERPL-SCNC: 136 MMOL/L (ref 136–145)
T4 FREE SERPL-MCNC: 1.41 NG/DL (ref 0.93–1.7)
TROPONIN T SERPL HS-MCNC: 13 NG/L
TSH SERPL DL<=0.05 MIU/L-ACNC: 1.9 UIU/ML (ref 0.27–4.2)
WBC NRBC COR # BLD AUTO: 6.63 10*3/MM3 (ref 3.4–10.8)
WHOLE BLOOD HOLD COAG: NORMAL
WHOLE BLOOD HOLD SPECIMEN: NORMAL

## 2024-03-08 PROCEDURE — 99291 CRITICAL CARE FIRST HOUR: CPT

## 2024-03-08 PROCEDURE — 25010000002 DIPHENHYDRAMINE PER 50 MG: Performed by: EMERGENCY MEDICINE

## 2024-03-08 PROCEDURE — 93312 ECHO TRANSESOPHAGEAL: CPT | Performed by: INTERNAL MEDICINE

## 2024-03-08 PROCEDURE — 93325 DOPPLER ECHO COLOR FLOW MAPG: CPT | Performed by: INTERNAL MEDICINE

## 2024-03-08 PROCEDURE — 93005 ELECTROCARDIOGRAM TRACING: CPT

## 2024-03-08 PROCEDURE — 83735 ASSAY OF MAGNESIUM: CPT

## 2024-03-08 PROCEDURE — 93321 DOPPLER ECHO F-UP/LMTD STD: CPT | Performed by: INTERNAL MEDICINE

## 2024-03-08 PROCEDURE — 96372 THER/PROPH/DIAG INJ SC/IM: CPT

## 2024-03-08 PROCEDURE — 93321 DOPPLER ECHO F-UP/LMTD STD: CPT

## 2024-03-08 PROCEDURE — 93005 ELECTROCARDIOGRAM TRACING: CPT | Performed by: INTERNAL MEDICINE

## 2024-03-08 PROCEDURE — 76376 3D RENDER W/INTRP POSTPROCES: CPT

## 2024-03-08 PROCEDURE — 84443 ASSAY THYROID STIM HORMONE: CPT

## 2024-03-08 PROCEDURE — 93010 ELECTROCARDIOGRAM REPORT: CPT | Performed by: INTERNAL MEDICINE

## 2024-03-08 PROCEDURE — 84484 ASSAY OF TROPONIN QUANT: CPT

## 2024-03-08 PROCEDURE — 71045 X-RAY EXAM CHEST 1 VIEW: CPT

## 2024-03-08 PROCEDURE — 85025 COMPLETE CBC W/AUTO DIFF WBC: CPT

## 2024-03-08 PROCEDURE — 80053 COMPREHEN METABOLIC PANEL: CPT

## 2024-03-08 PROCEDURE — 84439 ASSAY OF FREE THYROXINE: CPT | Performed by: EMERGENCY MEDICINE

## 2024-03-08 PROCEDURE — 25810000003 SODIUM CHLORIDE 0.9 % SOLUTION: Performed by: EMERGENCY MEDICINE

## 2024-03-08 PROCEDURE — 96375 TX/PRO/DX INJ NEW DRUG ADDON: CPT

## 2024-03-08 PROCEDURE — 96366 THER/PROPH/DIAG IV INF ADDON: CPT

## 2024-03-08 PROCEDURE — 25010000002 ENOXAPARIN PER 10 MG: Performed by: EMERGENCY MEDICINE

## 2024-03-08 PROCEDURE — 99238 HOSP IP/OBS DSCHRG MGMT 30/<: CPT | Performed by: INTERNAL MEDICINE

## 2024-03-08 PROCEDURE — 83880 ASSAY OF NATRIURETIC PEPTIDE: CPT

## 2024-03-08 PROCEDURE — 99213 OFFICE O/P EST LOW 20 MIN: CPT | Performed by: NURSE PRACTITIONER

## 2024-03-08 PROCEDURE — 93325 DOPPLER ECHO COLOR FLOW MAPG: CPT

## 2024-03-08 PROCEDURE — 25010000002 MIDAZOLAM PER 1 MG: Performed by: INTERNAL MEDICINE

## 2024-03-08 PROCEDURE — 96376 TX/PRO/DX INJ SAME DRUG ADON: CPT

## 2024-03-08 PROCEDURE — 85610 PROTHROMBIN TIME: CPT | Performed by: INTERNAL MEDICINE

## 2024-03-08 PROCEDURE — 93000 ELECTROCARDIOGRAM COMPLETE: CPT | Performed by: NURSE PRACTITIONER

## 2024-03-08 PROCEDURE — 25010000002 ADENOSINE PER 6 MG

## 2024-03-08 PROCEDURE — 96365 THER/PROPH/DIAG IV INF INIT: CPT

## 2024-03-08 RX ORDER — METOPROLOL TARTRATE 1 MG/ML
INJECTION, SOLUTION INTRAVENOUS
Status: COMPLETED
Start: 2024-03-08 | End: 2024-03-08

## 2024-03-08 RX ORDER — ENOXAPARIN SODIUM 100 MG/ML
1 INJECTION SUBCUTANEOUS ONCE
Status: COMPLETED | OUTPATIENT
Start: 2024-03-08 | End: 2024-03-08

## 2024-03-08 RX ORDER — DILTIAZEM HYDROCHLORIDE 5 MG/ML
10 INJECTION INTRAVENOUS ONCE
Status: COMPLETED | OUTPATIENT
Start: 2024-03-08 | End: 2024-03-08

## 2024-03-08 RX ORDER — DILTIAZEM HCL/D5W 125 MG/125
5-15 PLASTIC BAG, INJECTION (ML) INTRAVENOUS
Status: DISCONTINUED | OUTPATIENT
Start: 2024-03-08 | End: 2024-03-08

## 2024-03-08 RX ORDER — ADENOSINE 3 MG/ML
6 INJECTION, SOLUTION INTRAVENOUS ONCE
Status: COMPLETED | OUTPATIENT
Start: 2024-03-08 | End: 2024-03-08

## 2024-03-08 RX ORDER — FENTANYL CITRATE 50 UG/ML
50-100 INJECTION, SOLUTION INTRAMUSCULAR; INTRAVENOUS ONCE AS NEEDED
Status: DISCONTINUED | OUTPATIENT
Start: 2024-03-08 | End: 2024-03-09 | Stop reason: HOSPADM

## 2024-03-08 RX ORDER — MIDAZOLAM HYDROCHLORIDE 1 MG/ML
6 INJECTION INTRAMUSCULAR; INTRAVENOUS ONCE
Status: DISCONTINUED | OUTPATIENT
Start: 2024-03-08 | End: 2024-03-08

## 2024-03-08 RX ORDER — METOPROLOL TARTRATE 50 MG/1
50 TABLET, FILM COATED ORAL 2 TIMES DAILY
Qty: 180 TABLET | Refills: 3 | Status: SHIPPED | OUTPATIENT
Start: 2024-03-08

## 2024-03-08 RX ORDER — ADENOSINE 3 MG/ML
INJECTION, SOLUTION INTRAVENOUS
Status: COMPLETED
Start: 2024-03-08 | End: 2024-03-08

## 2024-03-08 RX ORDER — MIDAZOLAM HYDROCHLORIDE 1 MG/ML
2-8 INJECTION INTRAMUSCULAR; INTRAVENOUS ONCE AS NEEDED
Status: COMPLETED | OUTPATIENT
Start: 2024-03-08 | End: 2024-03-08

## 2024-03-08 RX ORDER — METOPROLOL TARTRATE 1 MG/ML
5 INJECTION, SOLUTION INTRAVENOUS ONCE
Status: COMPLETED | OUTPATIENT
Start: 2024-03-08 | End: 2024-03-08

## 2024-03-08 RX ORDER — FLUMAZENIL 0.1 MG/ML
0.5 INJECTION INTRAVENOUS ONCE AS NEEDED
Status: DISCONTINUED | OUTPATIENT
Start: 2024-03-08 | End: 2024-03-09 | Stop reason: HOSPADM

## 2024-03-08 RX ORDER — METOPROLOL TARTRATE 1 MG/ML
2.5 INJECTION, SOLUTION INTRAVENOUS EVERY 6 HOURS PRN
Status: DISCONTINUED | OUTPATIENT
Start: 2024-03-08 | End: 2024-03-09 | Stop reason: HOSPADM

## 2024-03-08 RX ORDER — DIPHENHYDRAMINE HYDROCHLORIDE 50 MG/ML
25 INJECTION INTRAMUSCULAR; INTRAVENOUS ONCE
Status: COMPLETED | OUTPATIENT
Start: 2024-03-08 | End: 2024-03-08

## 2024-03-08 RX ORDER — NALOXONE HCL 0.4 MG/ML
0.4 VIAL (ML) INJECTION ONCE AS NEEDED
Status: DISCONTINUED | OUTPATIENT
Start: 2024-03-08 | End: 2024-03-09 | Stop reason: HOSPADM

## 2024-03-08 RX ORDER — SODIUM CHLORIDE 0.9 % (FLUSH) 0.9 %
10 SYRINGE (ML) INJECTION AS NEEDED
Status: DISCONTINUED | OUTPATIENT
Start: 2024-03-08 | End: 2024-03-09 | Stop reason: HOSPADM

## 2024-03-08 RX ORDER — METOPROLOL TARTRATE 1 MG/ML
2.5 INJECTION, SOLUTION INTRAVENOUS EVERY 6 HOURS SCHEDULED
Status: DISCONTINUED | OUTPATIENT
Start: 2024-03-08 | End: 2024-03-08

## 2024-03-08 RX ADMIN — MIDAZOLAM HYDROCHLORIDE 3 MG: 1 INJECTION, SOLUTION INTRAMUSCULAR; INTRAVENOUS at 16:01

## 2024-03-08 RX ADMIN — METOPROLOL TARTRATE 5 MG: 1 INJECTION, SOLUTION INTRAVENOUS at 16:03

## 2024-03-08 RX ADMIN — ADENOSINE 6 MG: 3 INJECTION, SOLUTION INTRAVENOUS at 16:04

## 2024-03-08 RX ADMIN — ENOXAPARIN SODIUM 70 MG: 80 INJECTION SUBCUTANEOUS at 12:17

## 2024-03-08 RX ADMIN — SODIUM CHLORIDE 500 ML: 9 INJECTION, SOLUTION INTRAVENOUS at 11:48

## 2024-03-08 RX ADMIN — Medication 5 MG/HR: at 10:53

## 2024-03-08 RX ADMIN — METOPROLOL TARTRATE 5 MG: 5 INJECTION INTRAVENOUS at 16:03

## 2024-03-08 RX ADMIN — DIPHENHYDRAMINE HYDROCHLORIDE 25 MG: 50 INJECTION INTRAMUSCULAR; INTRAVENOUS at 12:42

## 2024-03-08 RX ADMIN — METHOHEXITAL SODIUM 100 MG: 500 INJECTION, POWDER, LYOPHILIZED, FOR SOLUTION INTRAMUSCULAR; INTRAVENOUS; RECTAL at 16:02

## 2024-03-08 RX ADMIN — ADENOSINE 6 MG: 3 INJECTION INTRAVENOUS at 16:04

## 2024-03-08 RX ADMIN — DILTIAZEM HYDROCHLORIDE 10 MG: 5 INJECTION INTRAVENOUS at 10:53

## 2024-03-08 NOTE — CONSULTS
"      Cardiology Consult         Reason for consultation:  Atrial flutter, RVR    Requesting provider: Jimbo Jo MD  Consulting provider: Wqaas Garcia MD        Active Hospital Problems    Diagnosis  POA    Atrial flutter with rapid ventricular response [I48.92]  Unknown     New onset 3/6, after shingles and RSV vaccines      History of scarlet fever [Z86.19]  Unknown    Mitral regurgitation [I34.0]  Unknown    Mixed hyperlipidemia [E78.2]  Yes    History of tobacco use [Z87.891]  Not Applicable              History of present illness:  Kim Watson is a 68-year-old female with above medical history, who is seen today in consultation for atrial flutter with RVR.  Patient got her shingles and RSV vaccines on 3/5 and was alerted by her Apple Watch the next day that she was in an abnormal rhythm.  She presented to her PCP clinic this morning for further evaluation and was noted to be in atrial flutter with RVR, heart rate in the 120s, and she was instructed to come to the ED for further evaluation and treatment.  She is hypoxic, requiring 4 L NC.  Workup significant for elevated proBNP with pulmonary edema on CXR.  She denies any cardiac history, including abnormal heart rhythm, CAD, or hypertension.  Patient reports her blood pressure is typically on the lower side, and she has been told by doctors in the past to \"eat all the salt she wants.\"  She is a former smoker with 50-pack-year history, quit in 2018.  Denies family history of CAD, but reports cardiac arrest in her father.  Patient is active, walks several laps around her house daily, and denies any exertional symptoms with this.  She denies any current chest pain, pressure, tightness, or shortness of breath at rest, but does feel fatigued and short of breath with activity.      Allergies   Allergen Reactions    Morphine And Related GI Intolerance     SEVERE    Sulfa Antibiotics GI Intolerance     SEVERE       Prior to Admission medications    Not on File "       Past Medical History:   Diagnosis Date    Arthritis 5 to 8 yrs ago    Was told i had arthritis in hand    History of medical problems Early     Scarlet fever,  mitral valve regurgitation    History of tobacco abuse 2019    History of vitamin D deficiency 2019    Migraines     Mitral valve regurgitation     Pneumonia     Had walking pneumonia       Past Surgical History:   Procedure Laterality Date    HYSTERECTOMY      SINUS SURGERY      Deviated septum    SUBTOTAL HYSTERECTOMY      Still have tubes and ovaries       Family History   Problem Relation Age of Onset    Arthritis Mother     Migraines Mother     COPD Mother     Cancer Father         Bladder cancer    Alzheimer's disease Father     Diabetes Brother     Migraines Brother     Alcohol abuse Brother         Alcoholic       Social History     Tobacco Use   Smoking Status Former    Current packs/day: 0.00    Average packs/day: 1.5 packs/day for 44.8 years (67.2 ttl pk-yrs)    Types: Cigarettes, Electronic Cigarette    Start date: 1974    Quit date: 10/15/2018    Years since quittin.4   Smokeless Tobacco Never       Social History     Substance and Sexual Activity   Alcohol Use Yes    Comment: 1 or 2 ready to drink margaritas 5 or 6 times a week         Review of Systems:   Review of Systems   Cardiovascular:  Positive for dyspnea on exertion. Negative for chest pain, leg swelling, near-syncope, palpitations and syncope.   Respiratory:  Positive for cough.    All other systems reviewed and are negative.           Vital Sign Min/Max for last 24 hours  Temp  Min: 96.8 °F (36 °C)  Max: 98.4 °F (36.9 °C)   BP  Min: 95/70  Max: 118/80   Pulse  Min: 92  Max: 143   Resp  Min: 18  Max: 23   SpO2  Min: 93 %  Max: 97 %   No data recorded    No intake or output data in the 24 hours ending 24 1154        Telemetry: Atrial flutter, RVR    Vitals reviewed.   Constitutional:       General: Awake.   Pulmonary:      Effort:  "Pulmonary effort is normal.      Breath sounds: Normal breath sounds.   Cardiovascular:      PMI at left midclavicular line. Tachycardia present. Irregular rhythm. Normal S1. Normal S2.       Murmurs: There is no murmur.   Pulses:     Intact distal pulses.   Edema:     Peripheral edema absent.   Skin:     General: Skin is warm and dry.      Capillary Refill: Capillary refill takes less than 2 seconds.   Neurological:      Mental Status: Alert.              DATA REVIEW:    EKG (3/8/2024):     Vent. Rate : 119 BPM     Atrial Rate : 125 BPM     P-R Int :   * ms          QRS Dur :  86 ms      QT Int : 278 ms       P-R-T Axes :   *  71  54 degrees     QTc Int : 391 ms     atrial flutter  with rapid ventricular response with premature ventricular or aberrantly conducted complexes  Abnormal ECG    ECHO: pending      Results from last 7 days   Lab Units 03/08/24  1041   SODIUM mmol/L 136   POTASSIUM mmol/L 4.1   CHLORIDE mmol/L 105   BUN mg/dL 12   CREATININE mg/dL 0.83   MAGNESIUM mg/dL 2.1     Lab Results   Lab Value Date/Time    MG 2.1 03/08/2024 1041     Results from last 7 days   Lab Units 03/08/24  1041   HSTROP T ng/L 13     Results from last 7 days   Lab Units 03/08/24  1041   WBC 10*3/mm3 6.63   HEMOGLOBIN g/dL 14.3   HEMATOCRIT % 43.5   PLATELETS 10*3/mm3 289     No results found for: \"HGBA1C\"  Lab Results   Component Value Date    CHOL 186 12/07/2023    TRIG 64 12/07/2023    HDL 67 (H) 12/07/2023     (H) 12/07/2023              Atrial flutter, RVR  New onset 3/6/2024, following shingles and RSV vaccines  [id number] 2 (age, female).  Lovenox.  HR up to 140's sustained. Marginal hypotension. Diltiazem infusion.   Elevated proBNP  3,132. Likely secondary to sustained RVR.   Mixed hyperlipidemia  History of scarlet fever, with mitral regurgitation               ECV this afternoon in ED. Will also need SENA to rule out thrombus as it seems she has likely been in AF > 48 hours. As long as SENA doesn't show " markedly reduced EF, the remainder of cardiac workup can be completed as an outpatient.   Eliquis 5mg po bid for CVA prophylaxis and B-blockade for rate control. Not recommending antiarrhythmic therapy at this time.  Would also benefit from ischemic evaluation in the future, given cardiac risk factors and new onset atrial flutter.       EDSON Rome obtained past medical, family history, social history, review of systems, and functioned as a scribe for the remainder of the dictation for Dr. Jo.      Electronically signed by EDSON Rome, 03/08/24, 11:34 AM EST.

## 2024-03-08 NOTE — ED NOTES
.. Kim Watson    Nursing Report ED to Floor:  Mental status: AOx4  Ambulatory status: As Tolerated  Oxygen Therapy:  4LNC  Cardiac Rhythm: AFIB  Admitted from: Home  Safety Concerns:  None  Social Issues:   ED Room #:  29    ED Nurse Phone Extension - [phone] or may call [phone].      HPI:   Chief Complaint   Patient presents with    Irregular Heart Beat       Past Medical History:  Past Medical History:   Diagnosis Date    Arthritis 5 to 8 yrs ago    Was told i had arthritis in hand    History of medical problems Early 1970s    Scarlet fever, 1990s mitral valve regurgitation    History of tobacco abuse 4/5/2019    History of vitamin D deficiency 11/4/2019    Migraines     Mitral valve regurgitation     Pneumonia 1970s    Had walking pneumonia        Past Surgical History:  Past Surgical History:   Procedure Laterality Date    HYSTERECTOMY      SINUS SURGERY  1990s    Deviated septum    SUBTOTAL HYSTERECTOMY  1990s    Still have tubes and ovaries        Admitting Doctor:   No admitting provider for patient encounter.    Consulting Provider(s):  Consults       No orders found from 2/8/2024 to 3/9/2024.             Admitting Diagnosis:   The encounter diagnosis was Atrial flutter with rapid ventricular response.    Most Recent Vitals:   Vitals:    03/08/24 1225 03/08/24 1245 03/08/24 1300 03/08/24 1330   BP: 105/93  95/74 101/85   BP Location:    Right arm   Patient Position:    Lying   Pulse: 99 98 113 (!) 124   Resp:   18 18   Temp:       SpO2:  94% 96% 96%   Weight:       Height:           Active LDAs/IV Access:   Lines, Drains & Airways       Active LDAs       Name Placement date Placement time Site Days    Peripheral IV 03/08/24 1045 Anterior;Distal;Right Forearm 03/08/24  1045  Forearm  less than 1                    Labs (abnormal labs have a star):   Labs Reviewed   COMPREHENSIVE METABOLIC PANEL - Abnormal; Notable for the following components:       Result Value    Glucose 110 (*)     CO2 20.0 (*)     All  other components within normal limits    Narrative:     GFR Normal >60  Chronic Kidney Disease <60  Kidney Failure <15     BNP (IN-HOUSE) - Abnormal; Notable for the following components:    proBNP 3,132.0 (*)     All other components within normal limits    Narrative:     This assay is used as an aid in the diagnosis of individuals suspected of having heart failure. It can be used as an aid in the diagnosis of acute decompensated heart failure (ADHF) in patients presenting with signs and symptoms of ADHF to the emergency department (ED). In addition, NT-proBNP of <300 pg/mL indicates ADHF is not likely.    Age Range Result Interpretation  NT-proBNP Concentration (pg/mL:      <50             Positive            >450                   Gray                 300-450                    Negative             <300    50-75           Positive            >900                  Gray                300-900                  Negative            <300      >75             Positive            >1800                  Gray                300-1800                  Negative            <300   MAGNESIUM - Normal   SINGLE HSTROPONIN T - Normal    Narrative:     High Sensitive Troponin T Reference Range:  <14.0 ng/L- Negative Female for AMI  <22.0 ng/L- Negative Male for AMI  >=14 - Abnormal Female indicating possible myocardial injury.  >=22 - Abnormal Male indicating possible myocardial injury.   Clinicians would have to utilize clinical acumen, EKG, Troponin, and serial changes to determine if it is an Acute Myocardial Infarction or myocardial injury due to an underlying chronic condition.        TSH - Normal   CBC WITH AUTO DIFFERENTIAL - Normal   T4, FREE - Normal    Narrative:     Results may be falsely increased if patient taking Biotin.     PROTIME-INR - Normal   RAINBOW DRAW    Narrative:     The following orders were created for panel order [place] Draw.  Procedure                               Abnormality         Status                      ---------                               -----------         ------                     Green Top (Gel)[[id number]]                                  Final result               Lavender Top[[id number]]                                     Final result               Gold Top - SST[[id number]]                                   Final result               Russ Top[[id number]]                                         In process                 Light Blue Top[[id number]]                                   Final result                 Please view results for these tests on the individual orders.   CBC AND DIFFERENTIAL    Narrative:     The following orders were created for panel order CBC & Differential.  Procedure                               Abnormality         Status                     ---------                               -----------         ------                     CBC Auto Differential[[id number]]        Normal              Final result                 Please view results for these tests on the individual orders.   GREEN TOP   LAVENDER TOP   GOLD TOP - SST   LIGHT BLUE TOP   GRAY TOP       Meds Given in ED:   Medications   sodium chloride 0.9 % flush 10 mL (has no administration in time range)   midazolam (VERSED) injection 2-8 mg (has no administration in time range)   flumazenil (ROMAZICON) injection 0.5 mg (has no administration in time range)   fentaNYL citrate (PF) (SUBLIMAZE) injection  mcg (has no administration in time range)   naloxone (NARCAN) injection 0.4 mg (has no administration in time range)   methohexital (BREVITAL) injection  mg (has no administration in time range)   metoprolol tartrate (LOPRESSOR) injection 2.5 mg (has no administration in time range)   dilTIAZem (CARDIZEM) injection 10 mg (10 mg Intravenous Given 3/8/24 1053)   sodium chloride 0.9 % bolus 500 mL (0 mL Intravenous Stopped 3/8/24 1148)   Enoxaparin Sodium (LOVENOX) syringe 70 mg (70 mg Subcutaneous Given 3/8/24 1217)    diphenhydrAMINE (BENADRYL) injection 25 mg (25 mg Intravenous Given 3/8/24 2108)

## 2024-03-08 NOTE — DISCHARGE SUMMARY
[facility] Cardiology  Discharge Summary  Patient: Kim Watson     Date of Admission: 3/8/2024  Date of Discharge:      PCP: Robbie Koehler MD    Problem List:    History of tobacco use    Mixed hyperlipidemia    Atrial flutter with rapid ventricular response    History of scarlet fever    Mitral regurgitation      Presenting Problem  No admission diagnoses are documented for this encounter.      History of Present Illness and Hospital Course  Patient is a 68 y.o. female presented with symptomatic persistent atrial fibrillation with RVR.  Underwent successful SENA guided cardioversion.  SENA revealed moderate to severe mitral regurgitation with left atrial enlargement and preserved LV systolic function.  Rate control initiated with metoprolol tartrate 50 mg p.o. twice daily.  Stroke prophylaxis initiated with Eliquis 5 mg p.o. twice daily.  Will arrange for outpatient ambulatory ECG monitor and close clinical follow-up.      03/08 1619 Note By: Jimbo Jo III, MD    Consults:   Consults       No orders found from 2/8/2024 to 3/9/2024.              Condition on Discharge: Stable    Vital Signs  Temp:  [96.8 °F (36 °C)-98.7 °F (37.1 °C)] 98.7 °F (37.1 °C)  Heart Rate:  [] 110  Resp:  [18-23] 18  BP: ()/(50-93) 123/50    General Appearance:    Alert, cooperative, no distress, appears stated age   Lungs:     Clear to auscultation bilaterally, respirations unlabored   Chest Wall:    No tenderness or deformity    Heart:    Regular rate and rhythm, S1 and S2 normal, no murmur, rub   or gallop, normal carotid impulse bilaterally without bruit.   Extremities:   Extremities normal, atraumatic, no cyanosis or edema   Pulses:   2+ and symmetric all extremities   Skin:   Skin color, texture, turgor normal, no rashes or lesions       Discharge Disposition  Home or Self Care    Discharge Medications     Discharge Medications        New Medications        Instructions Start Date    apixaban 5 MG tablet tablet  Commonly known as: ELIQUIS   5 mg, Oral, 2 Times Daily      metoprolol tartrate 50 MG tablet  Commonly known as: LOPRESSOR   50 mg, Oral, 2 Times Daily               Discharge Diet   Normal    Activity at Discharge  As tolerated    Follow-up Appointments  Future Appointments   Date Time Provider Department Center   12/10/2024 10:00 AM Robbie Koehler MD [facility]     Additional Instructions for the Follow-ups that You Need to Schedule       Discharge Follow-up with Specialty: Cardiology; 1 Month   As directed      Specialty: Cardiology   Follow Up: 1 Month   Follow Up Details: Dr. Jo

## 2024-03-08 NOTE — ED PROVIDER NOTES
"Subjective   History of Present Illness  68-year-old female presents for evaluation of new onset atrial fibrillation.  Of note, the patient is remarkably healthy for her age.  She is not on any medications.  She tells me that on March 5 she got her vaccine for RSV and shingles.  The following day, on March 6, she tells me that her watch and her phone were telling her that she was \"in atrial fibrillation.\"  She endorses a sensation of shortness of breath, especially with exertion.  She denies any sensation of palpitations or rapid heart rate.  As a result of her persistent notifications, she went and saw her primary care physician this morning where an EKG in the office confirmed atrial fibrillation.  She was then sent here to the ED to be evaluated.  She denies any recent changes to her diet or caffeine intake.  She is unsure as to what may have triggered her current episode.      Review of Systems   Respiratory:  Positive for shortness of breath.    All other systems reviewed and are negative.      Past Medical History:   Diagnosis Date    Arthritis 5 to 8 yrs ago    Was told i had arthritis in hand    History of medical problems Early 1970s    Scarlet fever, 1990s mitral valve regurgitation    History of tobacco abuse 4/5/2019    History of vitamin D deficiency 11/4/2019    Migraines     Mitral valve regurgitation     Pneumonia 1970s    Had walking pneumonia       Allergies   Allergen Reactions    Morphine And Related GI Intolerance     SEVERE    Sulfa Antibiotics GI Intolerance     SEVERE       Past Surgical History:   Procedure Laterality Date    HYSTERECTOMY      SINUS SURGERY  1990s    Deviated septum    SUBTOTAL HYSTERECTOMY  1990s    Still have tubes and ovaries       Family History   Problem Relation Age of Onset    Arthritis Mother     Migraines Mother     COPD Mother     Cancer Father         Bladder cancer    Alzheimer's disease Father     Diabetes Brother     Migraines Brother     Alcohol abuse Brother  "        Alcoholic       Social History     Socioeconomic History    Marital status:    Tobacco Use    Smoking status: Former     Current packs/day: 0.00     Average packs/day: 1.5 packs/day for 44.8 years (67.2 ttl pk-yrs)     Types: Cigarettes, Electronic Cigarette     Start date: 1974     Quit date: 10/15/2018     Years since quittin.4    Smokeless tobacco: Never   Vaping Use    Vaping status: Never Used   Substance and Sexual Activity    Alcohol use: Yes     Comment: 1 or 2 ready to drink margaritas 5 or 6 times a week    Drug use: No    Sexual activity: Not Currently     Partners: Male           Objective   Physical Exam  Vitals and nursing note reviewed.   Constitutional:       General: She is not in acute distress.     Appearance: She is well-developed. She is not diaphoretic.      Comments: Nontoxic-appearing female   HENT:      Head: Normocephalic and atraumatic.   Eyes:      Pupils: Pupils are equal, round, and reactive to light.   Cardiovascular:      Rate and Rhythm: Tachycardia present. Rhythm irregular.      Heart sounds: Normal heart sounds. No murmur heard.     No friction rub. No gallop.   Pulmonary:      Effort: Pulmonary effort is normal. No respiratory distress.      Breath sounds: Normal breath sounds. No wheezing or rales.   Abdominal:      General: Bowel sounds are normal. There is no distension.      Palpations: Abdomen is soft. There is no mass.      Tenderness: There is no abdominal tenderness. There is no guarding or rebound.   Musculoskeletal:         General: Normal range of motion.      Cervical back: Neck supple.      Right lower leg: No edema.      Left lower leg: No edema.   Skin:     General: Skin is warm and dry.      Findings: No erythema or rash.   Neurological:      Mental Status: She is alert and oriented to person, place, and time.   Psychiatric:         Mood and Affect: Mood normal.         Thought Content: Thought content normal.         Judgment: Judgment  "normal.         Critical Care    Performed by: Javier Garcia MD  Authorized by: Javier Garcia MD    Critical care provider statement:     Critical care time (minutes):  35    Critical care was necessary to treat or prevent imminent or life-threatening deterioration of the following conditions:  Cardiac failure    Critical care was time spent personally by me on the following activities:  Development of treatment plan with patient or surrogate, discussions with consultants, evaluation of patient's response to treatment, examination of patient, obtaining history from patient or surrogate, ordering and performing treatments and interventions, ordering and review of laboratory studies, ordering and review of radiographic studies, pulse oximetry and re-evaluation of patient's condition             ED Course  ED Course as of 03/08/24 1409   Fri Mar 08, 2024   1032 68-year-old female presents for evaluation of new onset atrial fibrillation.  She tells me that on March 5 she got her vaccine for RSV and shingles.  The following day, on March 6, she tells me that her watch and phone were telling her that she was \"in atrial fibrillation.\"  She went and saw her primary care physician today where an EKG in the office confirmed atrial fibrillation.  She was then advised to come here to the ED to be evaluated.  On arrival to the ED, the patient is nontoxic-appearing but tachycardic.  Initial EKG interpreted independently by me revealed atrial flutter with rapid ventricular response and a heart rate of 119.  The patient is otherwise healthy.  She takes no medications.  No recent changes to her diet or caffeine intake.  Brought differential diagnosis.  We will obtain labs and imaging, and we will reassess following initial interventions. [DD]   1115 [id number] score of 2.  Lovenox given. [DD]   1141 I have reached out to our cardiology team who will come to the emergency department and see the patient in consult. " [DD]   6631 The patient was evaluated here in the emergency department by Dr. Jo of cardiology.  She will be admitted for a SENA/cardioversion for observation.  She is hemodynamically stable at this time and is aware/agreeable with the plan. [DD]      ED Course User Index  [DD] Javier Garcia MD                                        Recent Results (from the past 24 hour(s))   ECG 12 Lead ED Triage Standing Order; Dysrhythmia    Collection Time: 03/08/24 10:18 AM   Result Value Ref Range    QT Interval 278 ms    QTC Interval 391 ms   Comprehensive Metabolic Panel    Collection Time: 03/08/24 10:41 AM    Specimen: Blood   Result Value Ref Range    Glucose 110 (H) 65 - 99 mg/dL    BUN 12 8 - 23 mg/dL    Creatinine 0.83 0.57 - 1.00 mg/dL    Sodium 136 136 - 145 mmol/L    Potassium 4.1 3.5 - 5.2 mmol/L    Chloride 105 98 - 107 mmol/L    CO2 20.0 (L) 22.0 - 29.0 mmol/L    Calcium 9.2 8.6 - 10.5 mg/dL    Total Protein 7.2 6.0 - 8.5 g/dL    Albumin 4.2 3.5 - 5.2 g/dL    ALT (SGPT) 25 1 - 33 U/L    AST (SGOT) 27 1 - 32 U/L    Alkaline Phosphatase 79 39 - 117 U/L    Total Bilirubin 0.6 0.0 - 1.2 mg/dL    Globulin 3.0 gm/dL    A/G Ratio 1.4 g/dL    BUN/Creatinine Ratio 14.5 7.0 - 25.0    Anion Gap 11.0 5.0 - 15.0 mmol/L    eGFR 77.9 >60.0 mL/min/1.73   Magnesium    Collection Time: 03/08/24 10:41 AM    Specimen: Blood   Result Value Ref Range    Magnesium 2.1 1.6 - 2.4 mg/dL   Single High Sensitivity Troponin T    Collection Time: 03/08/24 10:41 AM    Specimen: Blood   Result Value Ref Range    HS Troponin T 13 <14 ng/L   TSH    Collection Time: 03/08/24 10:41 AM    Specimen: Blood   Result Value Ref Range    TSH 1.900 0.270 - 4.200 uIU/mL   BNP    Collection Time: 03/08/24 10:41 AM    Specimen: Blood   Result Value Ref Range    proBNP 3,132.0 (H) 0.0 - 900.0 pg/mL   Green Top (Gel)    Collection Time: 03/08/24 10:41 AM   Result Value Ref Range    Extra Tube Hold for add-ons.    Lavender Top    Collection Time:  03/08/24 10:41 AM   Result Value Ref Range    Extra Tube hold for add-on    Gold Top - SST    Collection Time: 03/08/24 10:41 AM   Result Value Ref Range    Extra Tube Hold for add-ons.    Light Blue Top    Collection Time: 03/08/24 10:41 AM   Result Value Ref Range    Extra Tube Hold for add-ons.    CBC Auto Differential    Collection Time: 03/08/24 10:41 AM    Specimen: Blood   Result Value Ref Range    WBC 6.63 3.40 - 10.80 10*3/mm3    RBC 4.57 3.77 - 5.28 10*6/mm3    Hemoglobin 14.3 12.0 - 15.9 g/dL    Hematocrit 43.5 34.0 - 46.6 %    MCV 95.2 79.0 - 97.0 fL    MCH 31.3 26.6 - 33.0 pg    MCHC 32.9 31.5 - 35.7 g/dL    RDW 13.5 12.3 - 15.4 %    RDW-SD 47.6 37.0 - 54.0 fl    MPV 11.0 6.0 - 12.0 fL    Platelets 289 140 - 450 10*3/mm3    Neutrophil % 63.9 42.7 - 76.0 %    Lymphocyte % 23.5 19.6 - 45.3 %    Monocyte % 9.7 5.0 - 12.0 %    Eosinophil % 1.8 0.3 - 6.2 %    Basophil % 0.8 0.0 - 1.5 %    Immature Grans % 0.3 0.0 - 0.5 %    Neutrophils, Absolute 4.24 1.70 - 7.00 10*3/mm3    Lymphocytes, Absolute 1.56 0.70 - 3.10 10*3/mm3    Monocytes, Absolute 0.64 0.10 - 0.90 10*3/mm3    Eosinophils, Absolute 0.12 0.00 - 0.40 10*3/mm3    Basophils, Absolute 0.05 0.00 - 0.20 10*3/mm3    Immature Grans, Absolute 0.02 0.00 - 0.05 10*3/mm3    nRBC 0.0 0.0 - 0.2 /100 WBC   T4, Free    Collection Time: 03/08/24 10:41 AM    Specimen: Blood   Result Value Ref Range    Free T4 1.41 0.93 - 1.70 ng/dL   Protime-INR    Collection Time: 03/08/24 10:41 AM    Specimen: Blood   Result Value Ref Range    Protime 13.1 12.2 - 14.5 Seconds    INR 0.98 0.89 - 1.12   ECG 12 Lead Rhythm Change    Collection Time: 03/08/24  1:32 PM   Result Value Ref Range    QT Interval 366 ms    QTC Interval 469 ms     Note: In addition to lab results from this visit, the labs listed above may include labs taken at another facility or during a different encounter within the last 24 hours. Please correlate lab times with ED admission and discharge times for  further clarification of the services performed during this visit.    XR Chest 1 View   Final Result   Impression:   Findings suggest CHF with mild pulmonary edema. Small effusions.         Electronically Signed: Meaghan Garcia MD     3/8/2024 11:16 AM EST     Workstation ID: [id number]        Vitals:    03/08/24 1225 03/08/24 1245 03/08/24 1300 03/08/24 1330   BP: 105/93  95/74 101/85   BP Location:    Right arm   Patient Position:    Lying   Pulse: 99 98 113 (!) 124   Resp:   18 18   Temp:       SpO2:  94% 96% 96%   Weight:       Height:         Medications   sodium chloride 0.9 % flush 10 mL (has no administration in time range)   midazolam (VERSED) injection 2-8 mg (has no administration in time range)   flumazenil (ROMAZICON) injection 0.5 mg (has no administration in time range)   fentaNYL citrate (PF) (SUBLIMAZE) injection  mcg (has no administration in time range)   naloxone (NARCAN) injection 0.4 mg (has no administration in time range)   methohexital (BREVITAL) injection  mg (has no administration in time range)   metoprolol tartrate (LOPRESSOR) injection 2.5 mg (has no administration in time range)   dilTIAZem (CARDIZEM) injection 10 mg (10 mg Intravenous Given 3/8/24 1053)   sodium chloride 0.9 % bolus 500 mL (0 mL Intravenous Stopped 3/8/24 1148)   Enoxaparin Sodium (LOVENOX) syringe 70 mg (70 mg Subcutaneous Given 3/8/24 1217)   diphenhydrAMINE (BENADRYL) injection 25 mg (25 mg Intravenous Given 3/8/24 1242)     ECG/EMG Results (last 24 hours)       Procedure Component Value Units Date/Time    ECG 12 Lead ED Triage Standing Order; Dysrhythmia [[id number]] Collected: 03/08/24 1018     Updated: 03/08/24 1033     QT Interval 278 ms      QTC Interval 391 ms     Narrative:      Test Reason : ED Triage Standing Order~  Blood Pressure :   */*   mmHG  Vent. Rate : 119 BPM     Atrial Rate : 125 BPM     P-R Int :   * ms          QRS Dur :  86 ms      QT Int : 278 ms       P-R-T Axes :   *  71  54  degrees     QTc Int : 391 ms    atrial flutter  with rapid ventricular response with premature ventricular or aberrantly conducted complexes  Abnormal ECG  No previous ECGs available  Confirmed by MD Garcia Michael ([id number]) on 3/8/2024 10:33:49 AM    Referred By: YAMIL           Confirmed By: Waqas Garcia MD    ECG 12 Lead Rhythm Change [[id number]] Collected: 03/08/24 1332     Updated: 03/08/24 1345     QT Interval 366 ms      QTC Interval 469 ms     Narrative:      Test Reason : Rhythm Change  Blood Pressure :   */*   mmHG  Vent. Rate :  99 BPM     Atrial Rate : 312 BPM     P-R Int :   * ms          QRS Dur :  86 ms      QT Int : 366 ms       P-R-T Axes :   *  79  54 degrees     QTc Int : 469 ms    Atrial fibrillation with premature ventricular or aberrantly conducted complexes  Abnormal ECG  When compared with ECG of 08-MAR-2024 10:18,  Previous ECG has undetermined rhythm, needs review    Referred By: YAMIL           Confirmed By:           ECG 12 Lead Rhythm Change   Preliminary Result   Test Reason : Rhythm Change   Blood Pressure :   */*   mmHG   Vent. Rate :  99 BPM     Atrial Rate : 312 BPM      P-R Int :   * ms          QRS Dur :  86 ms       QT Int : 366 ms       P-R-T Axes :   *  79  54 degrees      QTc Int : 469 ms      Atrial fibrillation with premature ventricular or aberrantly conducted    complexes   Abnormal ECG   When compared with ECG of 08-MAR-2024 10:18,   Previous ECG has undetermined rhythm, needs review      Referred By: YAMIL           Confirmed By:       ECG 12 Lead ED Triage Standing Order; Dysrhythmia   Final Result   Test Reason : ED Triage Standing Order~   Blood Pressure :   */*   mmHG   Vent. Rate : 119 BPM     Atrial Rate : 125 BPM      P-R Int :   * ms          QRS Dur :  86 ms       QT Int : 278 ms       P-R-T Axes :   *  71  54 degrees      QTc Int : 391 ms      atrial flutter  with rapid ventricular response with premature ventricular    or aberrantly conducted complexes   Abnormal  ECG   No previous ECGs available   Confirmed by MD Garcia Michael ([id number]) on 3/8/2024 10:33:49 AM      Referred By: EDMD           Confirmed By: Waqas Garcia MD                 Medical Decision Making  Risk  Prescription drug management.  Decision regarding hospitalization.        Final diagnoses:   Atrial flutter with rapid ventricular response       ED Disposition  ED Disposition       ED Disposition   Decision to Admit    Condition   --    Comment   --               No follow-up provider specified.       Medication List      No changes were made to your prescriptions during this visit.            Javier Garcia MD  03/08/24 2521

## 2024-03-08 NOTE — PROCEDURES
External synchronized cardioversion  Indication: Persistent A-fib with RVR  Procedure: Risk, benefits, alternatives to SENA guided cardioversion were discussed in detail.  Patient verbalized understand these concepts and agreed to proceed with procedure.  Sedation achieved using IV Versed and Brevital.  SENA verified absence of endocardial thrombus.  Successful cardioversion using 200 J biphasic synchronized energy converted patient from atrial fibrillation to sinus tachycardia.  Absence of underlying atrial tachycardia or slow atrial flutter verified using adenosine 6 mg IV push.  No complications encountered.

## 2024-03-08 NOTE — PROGRESS NOTES
"Patient Name: Kim Watson  : 1955   MRN: [id number]     Chief Complaint:    Chief Complaint   Patient presents with    Atrial Fibrillation     On and off since getting shingles and rsv vaccines       History of Present Illness: Kim Watson is a 68 y.o. female presents to clinic for irregular heart rate.    She received vaccines, Shingrix and RSV on 2024 and developed irregular heart rate the next day. Her Apple watch has alarmed her. She was asymptomatic except for a cough and some shortness of breath presented today.        Past Medical History:   Diagnosis Date    Arthritis 5 to 8 yrs ago    Was told i had arthritis in hand    History of medical problems Early     Scarlet fever,  mitral valve regurgitation    History of tobacco abuse 2019    History of vitamin D deficiency 2019    Migraines     Mitral valve regurgitation     Pneumonia     Had walking pneumonia         No echo available.     Subjective     Review of System: Review of Systems     Medications:   No current outpatient medications on file.    Allergies:   Allergies   Allergen Reactions    Morphine And Related GI Intolerance     SEVERE    Sulfa Antibiotics GI Intolerance     SEVERE       Objective     Physical Exam:   Vital Signs:   Vitals:    24 0848   BP: 118/80   BP Location: Right arm   Patient Position: Sitting   Cuff Size: Adult   Pulse: 92   Resp: 20   Temp: 96.8 °F (36 °C)   TempSrc: Infrared   Weight: 65.3 kg (144 lb)   Height: 161.9 cm (63.75\")   PainSc: 0-No pain     Body mass index is 24.91 kg/m².         Physical Exam  Constitutional:       Appearance: She is not ill-appearing.   Cardiovascular:      Rate and Rhythm: Tachycardia present. Rhythm irregular.      Heart sounds: Normal heart sounds.   Pulmonary:      Effort: Pulmonary effort is normal.      Breath sounds: Normal breath sounds.       ECG 12 Lead    Date/Time: 3/8/2024 12:40 PM  Performed by: Maggy Suazo" EDSON    Authorized by: Maggy Suazo APRN  Comparison: compared with previous ECG from 4/5/2018  Rhythm: atrial fibrillation  Rate: tachycardic  QRS axis: normal    Clinical impression: abnormal EKG  Comments: Afib replaces SR      Assessment / Plan      Assessment/Plan:   There are no diagnoses linked to this encounter.     General health maintenance  - The patient presents today for an acute visit for an irregular heart rate.    1. Irregular heart rate  - Blood pressure is stable. EKG shows A. fib with . I recommended patient go by ambulance to the hospital, she refused. Discussed risk of worsening condition and even death. Patient verbalized an understanding.  Patient wants to drive herself home and take care of her dogs. Then she will  call her sister and she promised that she would call an ambulance if her condition worsened. She left in a stable condition and report called to Jimbo at [facility] ED.        EDSON Coe  [facility]    Transcribed from ambient dictation for EDSON Coe by Robert Day.  03/08/24   10:17 EST    Patient or patient representative verbalized consent to the visit recording.  I have personally performed the services described in this document as transcribed by the above individual, and it is both accurate and complete.

## 2024-03-10 ENCOUNTER — NURSE TRIAGE (OUTPATIENT)
Dept: CALL CENTER | Facility: HOSPITAL | Age: 69
End: 2024-03-10
Payer: MEDICARE

## 2024-03-10 LAB
QT INTERVAL: 366 MS
QT INTERVAL: 376 MS
QTC INTERVAL: 469 MS
QTC INTERVAL: 513 MS

## 2024-03-10 NOTE — TELEPHONE ENCOUNTER
Advised caller the [place] cardiologist does not offer an answering service for after hours, she would have to be seen in ER or wait until the office opens. Gave parameters.   Reason for Disposition   Age > 60 years  (Exception: Brief heartbeat symptoms that went away and now feels well.)    Additional Information   Negative: Passed out (i.e., lost consciousness, collapsed and was not responding)   Negative: Shock suspected (e.g., cold/pale/clammy skin, too weak to stand, low BP, rapid pulse)   Negative: Difficult to awaken or acting confused (e.g., disoriented, slurred speech)   Negative: Visible sweat on face or sweat dripping down face   Negative: Unable to walk, or can only walk with assistance (e.g., requires support)   Negative: [1] Received SHOCK from implantable cardiac defibrillator AND [2] persisting symptoms (i.e., palpitations, lightheadedness)   Negative: [1] Dizziness, lightheadedness, or weakness AND [2] heart beating very rapidly (e.g., > 140 / minute)   Negative: [1] Dizziness, lightheadedness, or weakness AND [2] heart beating very slowly (e.g., < 50 / minute)   Negative: Sounds like a life-threatening emergency to the triager   Negative: Chest pain   Negative: Implantable Cardiac Defibrillator (ICD) or a pacemaker symptoms or questions   Negative: Difficulty breathing   Negative: Dizziness, lightheadedness, or weakness   Negative: [1] Heart beating very rapidly (e.g., > 140 / minute) AND [2] present now  (Exception: During exercise.)   Negative: Heart beating very slowly (e.g., < 50 / minute)  (Exception: Athlete and heart rate normal for caller.)   Negative: New or worsened shortness of breath with activity (dyspnea on exertion)   Negative: Patient sounds very sick or weak to the triager   Negative: [1] Heart beating very rapidly (e.g., > 140 / minute) AND [2] not present now  (Exception: During exercise.)   Negative: [1] Skipped or extra beat(s) AND [2] increases with exercise or exertion    "Negative: [1] Skipped or extra beat(s) AND [2] occurs 4 or more times per minute   Negative: New or worsened ankle swelling   Negative: History of heart disease (i.e., heart attack, bypass surgery, angina, angioplasty, CHF)  (Exception: Brief heartbeat symptoms that went away and now feels well.)    Answer Assessment - Initial Assessment Questions  1. DESCRIPTION: \"Please describe your heart rate or heartbeat that you are having\" (e.g., fast/slow, regular/irregular, skipped or extra beats, \"palpitations\")      Is having shortness of air but not palpitation today  2. ONSET: \"When did it start?\" (Minutes, hours or days)       This started on Friday, 3/8/24  3. DURATION: \"How long does it last\" (e.g., seconds, minutes, hours)      It comes and goes, has felt this way most of today  4. PATTERN \"Does it come and go, or has it been constant since it started?\"  \"Does it get worse with exertion?\"   \"Are you feeling it now?\"      Worse with exertion  5. TAP: \"Using your hand, can you tap out what you are feeling on a chair or table in front of you, so that I can hear?\" (Note: not all patients can do this)        \"I have afib\"   6. HEART RATE: \"Can you tell me your heart rate?\" \"How many beats in 15 seconds?\"  (Note: not all patients can do this)        She is not sure  7. RECURRENT SYMPTOM: \"Have you ever had this before?\" If Yes, ask: \"When was the last time?\" and \"What happened that time?\"       Yes, went to ER  8. CAUSE: \"What do you think is causing the palpitations?\"      Afib, underwent cardioversion  9. CARDIAC HISTORY: \"Do you have any history of heart disease?\" (e.g., heart attack, angina, bypass surgery, angioplasty, arrhythmia)       afib  10. OTHER SYMPTOMS: \"Do you have any other symptoms?\" (e.g., dizziness, chest pain, sweating, difficulty breathing)        none  11. PREGNANCY: \"Is there any chance you are pregnant?\" \"When was your last menstrual period?\"        na    Protocols used: Heart Rate and Heartbeat " Questions-ADULT-AH

## 2024-03-11 ENCOUNTER — TELEPHONE (OUTPATIENT)
Dept: CARDIOLOGY | Facility: CLINIC | Age: 69
End: 2024-03-11

## 2024-03-11 NOTE — TELEPHONE ENCOUNTER
"  Caller: Kim Watson \"Shaenll\"    Relationship to patient: Self    Best call back number: [phone]    Chief complaint: ATRIAL FLUTTER WITH RAPID VENTRICULAR RESPONSE    Type of visit: F/U APPT FROM ANIL VISIT    Requested date: WEEK OF 04-08-24       Additional notes:NOTHING AVAILABLE WITHIN THE REQUESTED TIME FRAME. PLEASE CONTACT PATIENT WITH A SUITABLE DATE.         "

## 2024-03-15 ENCOUNTER — OFFICE VISIT (OUTPATIENT)
Dept: CARDIOLOGY | Facility: HOSPITAL | Age: 69
End: 2024-03-15
Payer: MEDICARE

## 2024-03-15 ENCOUNTER — HOSPITAL ENCOUNTER (OUTPATIENT)
Dept: CARDIOLOGY | Facility: HOSPITAL | Age: 69
Discharge: HOME | End: 2024-03-15
Payer: MEDICARE

## 2024-03-15 VITALS
HEIGHT: 62 IN | SYSTOLIC BLOOD PRESSURE: 144 MMHG | TEMPERATURE: 97.7 F | DIASTOLIC BLOOD PRESSURE: 56 MMHG | HEART RATE: 54 BPM | BODY MASS INDEX: 25.91 KG/M2 | RESPIRATION RATE: 20 BRPM | WEIGHT: 140.8 LBS | OXYGEN SATURATION: 97 %

## 2024-03-15 DIAGNOSIS — I48.92 ATRIAL FLUTTER WITH RAPID VENTRICULAR RESPONSE: ICD-10-CM

## 2024-03-15 DIAGNOSIS — I48.92 ATRIAL FLUTTER WITH RAPID VENTRICULAR RESPONSE: Primary | ICD-10-CM

## 2024-03-15 DIAGNOSIS — I34.0 MITRAL VALVE INSUFFICIENCY, UNSPECIFIED ETIOLOGY: ICD-10-CM

## 2024-03-15 PROCEDURE — 93005 ELECTROCARDIOGRAM TRACING: CPT | Performed by: NURSE PRACTITIONER

## 2024-03-15 NOTE — PROGRESS NOTES
Chief Complaint  Atrial Fibrillation    Subjective      History of Present Illness {  Problem List  Visit  Diagnosis   Encounters  Notes  Medications  Labs  Result Review Imaging  Media :23}     Kim Watson, 68 y.o. female with past medical history significant for arthritis, scarlet fever, mitral valve regurgitation, tobacco use, and migraines, who presents to [facility] for Atrial Fibrillation  Patient was seen and evaluated by primary care on 3/8/2024 at which time EKG showed atrial fibrillation with RVR.  Patient was strongly encouraged to present to [facility] for further evaluation.  Upon arrival to our facility, twelve-lead EKG showed atrial flutter with RVR, rate 119.  Patient then underwent successful SENA guided cardioversion.  SENA did reveal moderate to severe MR with left atrial enlargement, and preserved LV systolic function.  She was then initiated on metoprolol tartrate for rate control, and Eliquis.  Patient was also discharged on 3/8/2024 with instructions to closely follow-up with cardiology.     Since time of discharge, she has had 3 episodes of atrial fibrillation according to her apple watch.  Upon further review of Apple Watch EKGs, it appears that she had episode of A-fib on 3/7.  Most recent atrial fibrillation report on Apple Watch appears to be artifact.  Heart rate at home has been ranging from 60s up to 130 briefly.  Blood pressure at home well-controlled and has been from 104-144 systolic prior to medication.  She currently denies any chest pain or pressure, lower extremity edema, or syncope.  Patient continues to have dyspnea on exertion.  She states she has had a cough recently and cough is causing her to be unable to sleep.  Patient is typically very active and walks thousands of steps per day.  Recently, she has not been as active as her family has encouraged her to rest.  She has had mild dizziness at times.  Patient has recently  "decreased caffeine intake significantly and is now only using minimal caffeine.  Patient does endorse significant alcohol use of 12-14 drinks per week.    She is currently wearing Holter monitor with instructions to discontinue and mail back on 3/16.  Takes monitor off 3/16.     SENA 3/8/2024:    Left ventricular systolic function is low normal. Left ventricular ejection fraction appears to be 51 - 55%.    The left atrial cavity is moderately dilated.    The right atrial cavity is mildly  dilated.    Moderate to severe mitral valve regurgitation is present.      Objective     Vital Signs:   Vitals:    03/15/24 1219 03/15/24 1220 03/15/24 1221   BP: 150/70 138/65 144/56   BP Location: Left arm Left arm Left arm   Patient Position: Sitting Standing Sitting   Cuff Size: Adult Adult Adult   Pulse: (!) 49 56 54   Resp:   20   Temp: 97.7 °F (36.5 °C) 97.7 °F (36.5 °C) 97.7 °F (36.5 °C)   TempSrc: Temporal Temporal Temporal   SpO2: 97% 97% 97%   Weight:   63.9 kg (140 lb 12.8 oz)   Height:   157.5 cm (62\")     Body mass index is 25.75 kg/m².  Physical Exam  Vitals and nursing note reviewed.   Constitutional:       Appearance: Normal appearance.   HENT:      Head: Normocephalic.   Eyes:      Pupils: Pupils are equal, round, and reactive to light.   Cardiovascular:      Rate and Rhythm: Normal rate and regular rhythm.      Pulses: Normal pulses.      Heart sounds: Normal heart sounds. No murmur heard.  Pulmonary:      Effort: Pulmonary effort is normal.      Breath sounds: Normal breath sounds.   Abdominal:      General: Bowel sounds are normal.      Palpations: Abdomen is soft.   Musculoskeletal:         General: Normal range of motion.      Cervical back: Normal range of motion.      Right lower leg: No edema.      Left lower leg: No edema.   Skin:     General: Skin is warm and dry.      Capillary Refill: Capillary refill takes less than 2 seconds.   Neurological:      Mental Status: She is alert and oriented to person, " place, and time.   Psychiatric:         Mood and Affect: Mood normal.         Thought Content: Thought content normal.                Data Reviewed:{ Labs  Result Review  Imaging  Med Tab  Media :23}     Lab Results   Component Value Date    WBC 6.63 03/08/2024    HGB 14.3 03/08/2024    HCT 43.5 03/08/2024    MCV 95.2 03/08/2024     03/08/2024      Lab Results   Component Value Date    GLUCOSE 110 (H) 03/08/2024    BUN 12 03/08/2024    CREATININE 0.83 03/08/2024    EGFR 77.9 03/08/2024    BCR 14.5 03/08/2024    K 4.1 03/08/2024    CO2 20.0 (L) 03/08/2024    CALCIUM 9.2 03/08/2024    ALBUMIN 4.2 03/08/2024    BILITOT 0.6 03/08/2024    AST 27 03/08/2024    ALT 25 03/08/2024      Lab Results   Component Value Date    TSH 1.900 03/08/2024      Lab Results   Component Value Date    CHOL 186 12/07/2023    TRIG 64 12/07/2023    HDL 67 (H) 12/07/2023     (H) 12/07/2023    ECG 12 Lead Rhythm Change (03/08/2024 15:57)  Adult Transesophageal Echo 3D (SENA) W/ Cont If Necessary Per Protocol (03/08/2024 15:56)  ECG 12 Lead Rhythm Change (03/08/2024 13:32)  ECG 12 Lead (03/08/2024 12:40)  ECG 12 Lead ED Triage Standing Order; Dysrhythmia (03/08/2024 10:18)      Assessment & Plan   Assessment and Plan {CC Problem List  Visit Diagnosis  ROS  Review (Popup)  Health Maintenance  Quality  BestPractice  Medications  SmartSets  SnapShot Encounters  Media :23}     1. Atrial flutter with rapid ventricular response  -Patient has been compliant with metoprolol and Eliquis.  She is compliant with monitoring her heart rate and rhythm at home via Apple Watch.  She is also checking her blood pressure prior to administration of metoprolol.  Since time of discharge, she has only had to hold her mid dose of metoprolol 1 time for a systolic blood pressure less than 100.  -She is currently wearing Holter monitor with plans to mail monitor back on 3/16  -Twelve-lead EKG while in office today showed sinus bradycardia, rate  58  - ECG 12 Lead; Future  -Patient with appointment in approximately 1 month to be evaluated by Dr. Jo.  Encourage patient to keep close follow-up as scheduled  -She was encouraged to reach out prior to next appointment if symptoms change or worsen.    2.  Mitral regurgitation  -Most recent SENA reviewed  -SENA 3/8/2024:    Left ventricular systolic function is low normal. Left ventricular ejection fraction appears to be 51 - 55%.    The left atrial cavity is moderately dilated.    The right atrial cavity is mildly  dilated.    Moderate to severe mitral valve regurgitation is present.  -Again, patient already has scheduled follow-up closely with Dr. Jo.  Encourage patient to keep cardiology appointment for further recommendations regarding mitral regurgitation    Follow Up {Instructions Charge Capture  Follow-up Communications :23}     Return if symptoms worsen or fail to improve.        Patient was given instructions and counseling regarding her condition or for health maintenance advice. Please see specific information pulled into the AVS if appropriate.  Patient was instructed to call the [facility] with any questions, concerns, or worsening symptoms.    Dictated Utilizing Dragon Dictation   Please note that portions of this note were completed with a voice recognition program.   Part of this note may be an electronic transcription/translation of spoken language to printed text using the Dragon Dictation System.      No

## 2024-03-18 LAB
QT INTERVAL: 484 MS
QTC INTERVAL: 475 MS

## 2024-03-20 ENCOUNTER — OFFICE VISIT (OUTPATIENT)
Dept: INTERNAL MEDICINE | Facility: CLINIC | Age: 69
End: 2024-03-20
Payer: MEDICARE

## 2024-03-20 VITALS
HEART RATE: 65 BPM | TEMPERATURE: 97.3 F | OXYGEN SATURATION: 99 % | WEIGHT: 136.4 LBS | BODY MASS INDEX: 24.95 KG/M2 | DIASTOLIC BLOOD PRESSURE: 68 MMHG | RESPIRATION RATE: 16 BRPM | SYSTOLIC BLOOD PRESSURE: 132 MMHG

## 2024-03-20 DIAGNOSIS — J43.8 OTHER EMPHYSEMA: ICD-10-CM

## 2024-03-20 DIAGNOSIS — Z12.31 ENCOUNTER FOR SCREENING MAMMOGRAM FOR MALIGNANT NEOPLASM OF BREAST: ICD-10-CM

## 2024-03-20 DIAGNOSIS — I34.0 SEVERE MITRAL REGURGITATION: ICD-10-CM

## 2024-03-20 DIAGNOSIS — I48.91 ATRIAL FIBRILLATION WITH RVR: Primary | ICD-10-CM

## 2024-03-20 DIAGNOSIS — Z87.891 HISTORY OF TOBACCO USE: ICD-10-CM

## 2024-03-20 NOTE — PROGRESS NOTES
Follow Up Office Visit      Date: 2024   Patient Name: Kim Watson  : 1955   MRN: [id number]     Chief Complaint:    Chief Complaint   Patient presents with    Atrial Fibrillation     fu       History of Present Illness: Kim Watson is a 68 y.o. female who is here today to follow up with chronic care management.    History of Present Illness  The patient presents for evaluation of multiple medical concerns.    She has been doing okay since she left the hospital. She had some sleepless nights because she developed a cough. It is pretty much gone now, but she could not sleep when she coughed. She could not breathe, so she was sitting up by herself and still could not sleep. She did not sleep for 24 hours on 2 different nights. She is not feeling a lot of pain, but every once in a while, she gets a twinge, but she ignores it. It comes and goes. She did not feel her heart beating really fast. She is out of atrial fibrillation now. Her daughter is worried about her valve since it is moderate-to-severe leakage. When she went to the [facility] [place], they had her go upstairs and told her that he would have to talk to her about that. She has been checking her blood pressure every day. She can not take metoprolol because her blood pressure is below 100. Her heart rate has not been out of the 50s at home. She was diagnosed with mitral valve regurgitation in the 90s. She was told that there were a couple of other valves that were having a problem, but they think it is all back to this. She was told that she could not drink for 2 weeks at the emergency room. She is not doing caffeine in her coffee anymore. She has switched to decaf and has not been drinking alcohol. She has alcohol at home.    She has COPD. She is not coughing anymore. She gets a little short of breath if she moves too quick up and down the steps or more than once. Other than that, she has been doing really good. She  had pulmonary function testing once. She is on Eliquis.   She received her second shingles vaccine and it made her really sick.      Subjective      Review of Systems:   Review of Systems   All other systems reviewed and are negative.      I have reviewed the patients family history, social history, past medical history, past surgical history and have updated it as appropriate.     Medications:     Current Outpatient Medications:     apixaban (ELIQUIS) 5 MG tablet tablet, Take 1 tablet by mouth 2 (Two) Times a Day., Disp: 90 tablet, Rfl: 1    metoprolol tartrate (LOPRESSOR) 50 MG tablet, Take 1 tablet by mouth 2 (Two) Times a Day., Disp: 180 tablet, Rfl: 3    Umeclidinium-Vilanterol (ANORO ELLIPTA) 62.5-25 MCG/ACT aerosol powder  inhaler, Inhale 1 puff Daily., Disp: 60 each, Rfl: 1    Allergies:   Allergies   Allergen Reactions    Diltiazem Itching and Rash     Rash along vein of IV; itching    Morphine And Related GI Intolerance     SEVERE    Sulfa Antibiotics GI Intolerance     SEVERE       Objective     Physical Exam: Please see above  Vital Signs:   Vitals:    03/20/24 0933   BP: 132/68   BP Location: Right arm   Patient Position: Sitting   Cuff Size: Adult   Pulse: 65   Resp: 16   Temp: 97.3 °F (36.3 °C)   TempSrc: Temporal   SpO2: 99%   Weight: 61.9 kg (136 lb 6.4 oz)   PainSc: 0-No pain     Body mass index is 24.95 kg/m².  BMI is within normal parameters. No other follow-up for BMI required.       Physical Exam  Vitals and nursing note reviewed.   Constitutional:       General: She is not in acute distress.     Appearance: Normal appearance. She is normal weight. She is not ill-appearing or toxic-appearing.   HENT:      Nose: No congestion or rhinorrhea.   Eyes:      General:         Right eye: No discharge.         Left eye: No discharge.      Conjunctiva/sclera: Conjunctivae normal.   Cardiovascular:      Rate and Rhythm: Normal rate and regular rhythm.      Heart sounds: Normal heart sounds. No murmur  heard.     No friction rub.   Pulmonary:      Effort: Pulmonary effort is normal. No respiratory distress.      Breath sounds: Normal breath sounds. No wheezing or rhonchi.   Abdominal:      General: Abdomen is flat. Bowel sounds are normal. There is no distension.      Palpations: Abdomen is soft. There is no mass.      Tenderness: There is no abdominal tenderness. There is no guarding or rebound.   Musculoskeletal:      Cervical back: Normal range of motion.      Right lower leg: No edema.      Left lower leg: No edema.   Skin:     Coloration: Skin is not jaundiced.      Findings: No lesion or rash.   Neurological:      General: No focal deficit present.      Mental Status: She is alert. Mental status is at baseline.      Coordination: Coordination normal.      Gait: Gait normal.   Psychiatric:         Mood and Affect: Mood normal.         Behavior: Behavior normal.         Thought Content: Thought content normal.         Judgment: Judgment normal.         Procedures    Results:   Results  Imaging  Echocardiogram from 03/08/2024 was reviewed with the patient.    Labs:   TSH   Date Value Ref Range Status   03/08/2024 1.900 0.270 - 4.200 uIU/mL Final        Imaging:   No valid procedures specified.     Assessment / Plan      Assessment/Plan:   Problem List Items Addressed This Visit       History of tobacco use    Relevant Orders     CT Chest Low Dose Cancer Screening WO    Other emphysema    Relevant Medications    Umeclidinium-Vilanterol (ANORO ELLIPTA) 62.5-25 MCG/ACT aerosol powder  inhaler    Other Relevant Orders    Complete PFT - Pre & Post Bronchodilator    Atrial fibrillation with RVR - Primary    Overview     New onset 3/6, after shingles and RSV vaccines         Severe mitral regurgitation     Other Visit Diagnoses       Encounter for screening mammogram for malignant neoplasm of breast        Relevant Orders    Mammo Screening Digital Tomosynthesis Bilateral With CAD            Assessment & Plan  1.  Atrial fibrillation.  Her ejection fraction is above 40 percent. She is not feeling palpitations significantly. Her pulse today was 65. She will continue metoprolol tartrate. If she feels like she is having palpitations more consistently, we need to work with cardiology.    2. Mitral valve regurgitation.  She will follow up with interventional cardiologist in 04/2024 to discuss surgical interventions.  Asymptomatic currently.    3. COPD.  I will start her on Anoro inhaler. I will order a pulmonary function test. If she ends up needing it, she will call us and we will figure out an alternative option.    4. Shingles.  She is supposed to get her second shingles vaccine on 05/06/2024. I recommend completing the series.    5. Health maintenance.  I would recommend CT low-dose lung cancer screening based on smoking history and the mammograms at least in the short term. I will schedule the next mammogram in 2026.    6. Alcohol use.  She is at high risk for going into severe arrhythmias. She is to avoid caffeine and alcohol.    Follow-up  The patient will follow up in 3 months.    Follow Up:   Return in about 3 months (around 6/20/2024) for Recheck.        Patient or patient representative verbalized consent for the use of Ambient Listening during the visit with  Robbie Koehler MD for chart documentation. 3/20/2024  11:12 EDT    Robbie Koehler MD  [facility] [place]

## 2024-03-25 ENCOUNTER — TELEPHONE (OUTPATIENT)
Dept: INTERNAL MEDICINE | Facility: CLINIC | Age: 69
End: 2024-03-25
Payer: MEDICARE

## 2024-03-25 NOTE — TELEPHONE ENCOUNTER
Emmie:    Just connecting you with this patient's question as I am not sure about the specific locations of the testing.  Thanks.

## 2024-03-25 NOTE — TELEPHONE ENCOUNTER
"    Caller: Kim Watson \"Shanell\"    Relationship to patient: Self    Best call back number: [phone]     PATIENT STATES THAT DR NANA IS SETTING HER UP FOR A CT SCAN AND WANTED TO KNOW IF SHE NEEDS TO SET UP THE PFT ALSO AT THE SAME LOCATION.  "

## 2024-04-22 ENCOUNTER — OFFICE VISIT (OUTPATIENT)
Dept: CARDIOLOGY | Facility: CLINIC | Age: 69
End: 2024-04-22
Payer: MEDICARE

## 2024-04-22 VITALS
HEART RATE: 52 BPM | WEIGHT: 136.8 LBS | BODY MASS INDEX: 23.35 KG/M2 | HEIGHT: 64 IN | DIASTOLIC BLOOD PRESSURE: 74 MMHG | SYSTOLIC BLOOD PRESSURE: 144 MMHG | OXYGEN SATURATION: 99 %

## 2024-04-22 DIAGNOSIS — I34.0 SEVERE MITRAL REGURGITATION: ICD-10-CM

## 2024-04-22 DIAGNOSIS — I48.91 ATRIAL FIBRILLATION WITH RVR: Primary | ICD-10-CM

## 2024-04-22 PROCEDURE — 99213 OFFICE O/P EST LOW 20 MIN: CPT | Performed by: INTERNAL MEDICINE

## 2024-04-22 NOTE — PROGRESS NOTES
"[facility]  Office visit  Kim Watson  1955  [phone]  There is no work phone number on file.    VISIT DATE:  4/22/2024    PCP: Robbie Koehler MD  [address]  [facility] [address]    CC:  Chief Complaint   Patient presents with    Paroxysmal Atrial Fibrillation     HFU    Mitral Regurgitation     HFU       Previous cardiac studies and procedures:  March 2024 SENA with successful cardioversion    Left ventricular systolic function is low normal. Left ventricular ejection fraction appears to be 51 - 55%.    The left atrial cavity is moderately dilated.    The right atrial cavity is mildly  dilated.    Moderate to severe mitral valve regurgitation is present.     April 2024 Holter, 48-hour   Frequent PVCs, 6%.     ASSESSMENT:   Diagnosis Plan   1. Atrial fibrillation with RVR        2. Severe mitral regurgitation  Adult Transthoracic Echo Complete W/ Cont if Necessary Per Protocol          PLAN:  Persistent atrial fibrillation: Maintaining sinus rhythm.  Chads Vasc equal to 2.  Continue Eliquis 5 mg p.o. twice daily.  Continue beta-blockade for rate control.    Mitral insufficiency: Moderate to severe.  Repeat echocardiographic assessment of severity now the patient is maintaining sinus rhythm.    PVCs: Asymptomatic.  Continue beta-blockade.      Subjective  Denies chest pain, palpitations or dyspnea on exertion.  Blood pressures running less than 140/80 mmHg.  She is compliant with medical therapy.    PHYSICAL EXAMINATION:  Vitals:    04/22/24 1012   BP: 144/74   BP Location: Left arm   Patient Position: Sitting   Cuff Size: Adult   Pulse: 52   SpO2: 99%   Weight: 62.1 kg (136 lb 12.8 oz)   Height: 161.9 cm (63.75\")     General Appearance:    Alert, cooperative, no distress, appears stated age   Head:    Normocephalic, without obvious abnormality, atraumatic   Eyes:    conjunctiva/corneas clear   Nose:   Nares normal, septum midline, mucosa " "normal, no drainage   Throat:   Lips, teeth and gums normal   Neck:   Supple, symmetrical, trachea midline, no carotid    bruit or JVD   Lungs:     Clear to auscultation bilaterally, respirations unlabored   Chest Wall:    No tenderness or deformity    Heart:    Regular rate and rhythm, S1 and S2 normal, no murmur, rub   or gallop, normal carotid impulse bilaterally without bruit.   Abdomen:     Soft, non-tender   Extremities:   Extremities normal, atraumatic, no cyanosis or edema   Pulses:   2+ and symmetric all extremities   Skin:   Skin color, texture, turgor normal, no rashes or lesions       Diagnostic Data:  Procedures  Lab Results   Component Value Date    TRIG 64 12/07/2023    HDL 67 (H) 12/07/2023     Lab Results   Component Value Date    GLUCOSE 110 (H) 03/08/2024    BUN 12 03/08/2024    CREATININE 0.83 03/08/2024     03/08/2024    K 4.1 03/08/2024     03/08/2024    CO2 20.0 (L) 03/08/2024     No results found for: \"HGBA1C\"  Lab Results   Component Value Date    WBC 6.63 03/08/2024    HGB 14.3 03/08/2024    HCT 43.5 03/08/2024     03/08/2024       Allergies  Allergies   Allergen Reactions    Diltiazem Itching and Rash     Rash along vein of IV; itching    Morphine And Related GI Intolerance     SEVERE    Sulfa Antibiotics GI Intolerance     SEVERE       Current Medications    Current Outpatient Medications:     apixaban (ELIQUIS) 5 MG tablet tablet, Take 1 tablet by mouth 2 (Two) Times a Day., Disp: 90 tablet, Rfl: 1    metoprolol tartrate (LOPRESSOR) 50 MG tablet, Take 1 tablet by mouth 2 (Two) Times a Day., Disp: 180 tablet, Rfl: 3    Budeson-Glycopyrrol-Formoterol (BREZTRI) 160-9-4.8 MCG/ACT aerosol inhaler, Inhale 2 puffs 2 (Two) Times a Day. (Patient not taking: Reported on 4/22/2024), Disp: 5.9 g, Rfl: 3          ROS  ROS      SOCIAL HX  Social History     Socioeconomic History    Marital status:    Tobacco Use    Smoking status: Former     Current packs/day: 0.00     " Average packs/day: 1.5 packs/day for 44.8 years (67.2 ttl pk-yrs)     Types: Cigarettes, Electronic Cigarette     Start date: 1974     Quit date: 10/15/2018     Years since quittin.5    Smokeless tobacco: Never   Vaping Use    Vaping status: Former   Substance and Sexual Activity    Alcohol use: Not Currently     Comment: 1 or 2 ready to drink margaritas 5 or 6 times a week. No more drinking since 3/7/24    Drug use: No    Sexual activity: Not Currently     Partners: Male       FAMILY HX  Family History   Problem Relation Age of Onset    Arrhythmia Mother         pacemaker    Arthritis Mother     Migraines Mother     COPD Mother     Asthma Mother     Fainting Mother         Mini strokes    Arrhythmia Father         pacemaker    Cancer Father         Bladder cancer    Alzheimer's disease Father     Other Sister         Unsure about sister's issues    Diabetes Sister     Diabetes Brother     Migraines Brother     Alcohol abuse Brother         Alcoholic    No Known Problems Brother     No Known Problems Brother     No Known Problems Maternal Grandmother     Alzheimer's disease Maternal Grandfather     Lung disease Paternal Grandmother     No Known Problems Paternal Grandfather              Jimbo Jo III, MD, FACC

## 2024-04-23 ENCOUNTER — HOSPITAL ENCOUNTER (OUTPATIENT)
Dept: PULMONOLOGY | Facility: HOSPITAL | Age: 69
Discharge: HOME | End: 2024-04-23
Payer: MEDICARE

## 2024-04-23 ENCOUNTER — HOSPITAL ENCOUNTER (OUTPATIENT)
Dept: CT IMAGING | Facility: HOSPITAL | Age: 69
Discharge: HOME | End: 2024-04-23
Payer: MEDICARE

## 2024-04-23 DIAGNOSIS — Z87.891 HISTORY OF TOBACCO USE: ICD-10-CM

## 2024-04-23 DIAGNOSIS — J43.8 OTHER EMPHYSEMA: ICD-10-CM

## 2024-04-23 PROCEDURE — 94664 DEMO&/EVAL PT USE INHALER: CPT

## 2024-04-23 PROCEDURE — 71271 CT THORAX LUNG CANCER SCR C-: CPT

## 2024-04-23 PROCEDURE — 94727 GAS DIL/WSHOT DETER LNG VOL: CPT

## 2024-04-23 PROCEDURE — 94729 DIFFUSING CAPACITY: CPT

## 2024-04-23 PROCEDURE — 94640 AIRWAY INHALATION TREATMENT: CPT

## 2024-04-23 PROCEDURE — 94060 EVALUATION OF WHEEZING: CPT

## 2024-04-23 RX ORDER — ALBUTEROL SULFATE 2.5 MG/3ML
2.5 SOLUTION RESPIRATORY (INHALATION) ONCE
Status: COMPLETED | OUTPATIENT
Start: 2024-04-23 | End: 2024-04-23

## 2024-04-23 RX ADMIN — ALBUTEROL SULFATE 2.5 MG: 2.5 SOLUTION RESPIRATORY (INHALATION) at 14:18

## 2024-04-24 PROCEDURE — 94727 GAS DIL/WSHOT DETER LNG VOL: CPT | Performed by: INTERNAL MEDICINE

## 2024-04-24 PROCEDURE — 94729 DIFFUSING CAPACITY: CPT | Performed by: INTERNAL MEDICINE

## 2024-04-24 PROCEDURE — 94060 EVALUATION OF WHEEZING: CPT | Performed by: INTERNAL MEDICINE

## 2024-04-25 ENCOUNTER — HOSPITAL ENCOUNTER (OUTPATIENT)
Dept: MAMMOGRAPHY | Facility: HOSPITAL | Age: 69
Discharge: HOME | End: 2024-04-25
Admitting: STUDENT IN AN ORGANIZED HEALTH CARE EDUCATION/TRAINING PROGRAM
Payer: MEDICARE

## 2024-04-25 DIAGNOSIS — Z12.31 ENCOUNTER FOR SCREENING MAMMOGRAM FOR MALIGNANT NEOPLASM OF BREAST: ICD-10-CM

## 2024-04-25 PROCEDURE — 77067 SCR MAMMO BI INCL CAD: CPT

## 2024-04-25 PROCEDURE — 77063 BREAST TOMOSYNTHESIS BI: CPT

## 2024-05-29 ENCOUNTER — HOSPITAL ENCOUNTER (OUTPATIENT)
Dept: CARDIOLOGY | Facility: HOSPITAL | Age: 69
Discharge: HOME | End: 2024-05-29
Admitting: INTERNAL MEDICINE
Payer: MEDICARE

## 2024-05-29 VITALS — BODY MASS INDEX: 24.22 KG/M2 | HEIGHT: 63 IN | WEIGHT: 136.69 LBS

## 2024-05-29 DIAGNOSIS — I34.0 SEVERE MITRAL REGURGITATION: ICD-10-CM

## 2024-05-29 LAB
ASCENDING AORTA: 2.4 CM
BH CV ECHO MEAS - AO MAX PG: 8.2 MMHG
BH CV ECHO MEAS - AO MEAN PG: 4.1 MMHG
BH CV ECHO MEAS - AO ROOT DIAM: 3 CM
BH CV ECHO MEAS - AO V2 MAX: 143 CM/SEC
BH CV ECHO MEAS - AO V2 VTI: 36.8 CM
BH CV ECHO MEAS - EF(MOD-BP): 67.4 %
BH CV ECHO MEAS - EF(MOD-SP2): 63.5 %
BH CV ECHO MEAS - EF(MOD-SP4): 70 %
BH CV ECHO MEAS - ESV(CUBED): 47.4 ML
BH CV ECHO MEAS - FS: 35 %
BH CV ECHO MEAS - IVS/LVPW: 1.03 CM
BH CV ECHO MEAS - IVSD: 0.98 CM
BH CV ECHO MEAS - LA DIMENSION: 4.6 CM
BH CV ECHO MEAS - LAT PEAK E' VEL: 9 CM/SEC
BH CV ECHO MEAS - LV MAX PG: 3.7 MMHG
BH CV ECHO MEAS - LV MEAN PG: 1.7 MMHG
BH CV ECHO MEAS - LV V1 MAX: 95.5 CM/SEC
BH CV ECHO MEAS - LV V1 VTI: 23.5 CM
BH CV ECHO MEAS - LVIDD: 5.2 CM
BH CV ECHO MEAS - LVIDS: 3.4 CM
BH CV ECHO MEAS - LVOT AREA: 3.1 CM2
BH CV ECHO MEAS - LVOT DIAM: 2 CM
BH CV ECHO MEAS - LVPWD: 0.95 CM
BH CV ECHO MEAS - MED PEAK E' VEL: 8 CM/SEC
BH CV ECHO MEAS - MR MAX PG: 76.8 MMHG
BH CV ECHO MEAS - MR MAX VEL: 437 CM/SEC
BH CV ECHO MEAS - MR MEAN PG: 43.6 MMHG
BH CV ECHO MEAS - MR VTI: 134.5 CM
BH CV ECHO MEAS - MV A MAX VEL: 98 CM/SEC
BH CV ECHO MEAS - MV DEC SLOPE: 208 CM/SEC2
BH CV ECHO MEAS - MV E MAX VEL: 107 CM/SEC
BH CV ECHO MEAS - MV E/A: 1.09
BH CV ECHO MEAS - MV MAX PG: 5.8 MMHG
BH CV ECHO MEAS - MV MEAN PG: 2.1 MMHG
BH CV ECHO MEAS - MV V2 VTI: 72.2 CM
BH CV ECHO MEAS - PA ACC TIME: 0.23 SEC
BH CV ECHO MEAS - PA V2 MAX: 76 CM/SEC
BH CV ECHO MEAS - PI END-D VEL: 97.5 CM/SEC
BH CV ECHO MEAS - RAP SYSTOLE: 3 MMHG
BH CV ECHO MEAS - RVSP: 28 MMHG
BH CV ECHO MEAS - TAPSE (>1.6): 3.74 CM
BH CV ECHO MEAS - TR MAX PG: 25 MMHG
BH CV ECHO MEAS - TR MAX VEL: 252 CM/SEC
BH CV ECHO MEASUREMENTS AVERAGE E/E' RATIO: 12.59
BH CV VAS BP RIGHT ARM: NORMAL MMHG
BH CV XLRA - RV BASE: 4.98 CM
BH CV XLRA - RV LENGTH: 7.1 CM
BH CV XLRA - RV MID: 3.5 CM
BH CV XLRA - TDI S': 14 CM/SEC
IVRT: 88 MS
LEFT ATRIUM VOLUME INDEX: 63 ML/M2
MR PISA EROA: 0.12 CM2
PISA ALIASING VEL: 0.32 M/S
PISA RADIUS: 0.5 CM

## 2024-05-29 PROCEDURE — 93306 TTE W/DOPPLER COMPLETE: CPT

## 2024-05-30 ENCOUNTER — TELEPHONE (OUTPATIENT)
Dept: CARDIOLOGY | Facility: CLINIC | Age: 69
End: 2024-05-30
Payer: MEDICARE

## 2024-05-30 NOTE — TELEPHONE ENCOUNTER
Jimbo Jo III, MD E[place], Susan D, RN  Heart function and leaking heart valve have improved on follow-up echo.  Notified of message above from . Verbalized understanding.

## 2024-06-10 RX ORDER — APIXABAN 5 MG/1
5 TABLET, FILM COATED ORAL 2 TIMES DAILY
Qty: 60 TABLET | Refills: 5 | Status: SHIPPED | OUTPATIENT
Start: 2024-06-10

## 2024-06-25 ENCOUNTER — OFFICE VISIT (OUTPATIENT)
Dept: INTERNAL MEDICINE | Facility: CLINIC | Age: 69
End: 2024-06-25
Payer: MEDICARE

## 2024-06-25 VITALS
RESPIRATION RATE: 18 BRPM | DIASTOLIC BLOOD PRESSURE: 78 MMHG | SYSTOLIC BLOOD PRESSURE: 138 MMHG | TEMPERATURE: 97.8 F | WEIGHT: 147.4 LBS | HEART RATE: 76 BPM | BODY MASS INDEX: 25.79 KG/M2

## 2024-06-25 DIAGNOSIS — I48.91 ATRIAL FIBRILLATION WITH RVR: ICD-10-CM

## 2024-06-25 DIAGNOSIS — I34.0 SEVERE MITRAL REGURGITATION: ICD-10-CM

## 2024-06-25 DIAGNOSIS — J43.8 OTHER EMPHYSEMA: Primary | ICD-10-CM

## 2024-06-25 DIAGNOSIS — F41.9 ANXIETY AND DEPRESSION: ICD-10-CM

## 2024-06-25 DIAGNOSIS — B35.1 ONYCHOMYCOSIS: ICD-10-CM

## 2024-06-25 DIAGNOSIS — F32.A ANXIETY AND DEPRESSION: ICD-10-CM

## 2024-06-25 DIAGNOSIS — Z87.891 HISTORY OF TOBACCO USE: ICD-10-CM

## 2024-06-25 PROBLEM — Z86.19 HISTORY OF SCARLET FEVER: Status: RESOLVED | Noted: 2024-03-08 | Resolved: 2024-06-25

## 2024-06-25 PROCEDURE — G2211 COMPLEX E/M VISIT ADD ON: HCPCS | Performed by: STUDENT IN AN ORGANIZED HEALTH CARE EDUCATION/TRAINING PROGRAM

## 2024-06-25 PROCEDURE — 99214 OFFICE O/P EST MOD 30 MIN: CPT | Performed by: STUDENT IN AN ORGANIZED HEALTH CARE EDUCATION/TRAINING PROGRAM

## 2024-06-25 PROCEDURE — 1126F AMNT PAIN NOTED NONE PRSNT: CPT | Performed by: STUDENT IN AN ORGANIZED HEALTH CARE EDUCATION/TRAINING PROGRAM

## 2024-06-25 PROCEDURE — 1159F MED LIST DOCD IN RCRD: CPT | Performed by: STUDENT IN AN ORGANIZED HEALTH CARE EDUCATION/TRAINING PROGRAM

## 2024-06-25 PROCEDURE — 1160F RVW MEDS BY RX/DR IN RCRD: CPT | Performed by: STUDENT IN AN ORGANIZED HEALTH CARE EDUCATION/TRAINING PROGRAM

## 2024-06-25 NOTE — PROGRESS NOTES
Follow Up Office Visit      Date: 2024   Patient Name: Kim Watson  : 1955   MRN: [id number]     Chief Complaint:  No chief complaint on file.      History of Present Illness: Kim Watson is a 68 y.o. female who is here today to follow up with chronic care management.    History of Present Illness  The patient presents for evaluation of multiple medical concerns.    The patient underwent a CT lung cancer screening, which yielded no concerns. However, the CT lung cancer screening revealed mild emphysema or COPD.    The patient's cardiologist has recommended a repeat echocardiogram prior to her next appointment in 2024. She was informed that her condition was improved during her hospital stay.    The patient experiences occasional neck pain, which she attributes to her sleeping position.    The patient monitors her blood pressure twice daily. Her readings typically range from 101/40 to 127 systolic.    The patient expresses concern over her inability to lose weight, despite maintaining a healthy diet. Despite this, she continues to gain weight. She maintains an active lifestyle, including regular walking and using a tread climber.    The patient experiences intermittent twinges in her chest.      Subjective      Review of Systems:   Review of Systems   All other systems reviewed and are negative.      I have reviewed the patients family history, social history, past medical history, past surgical history and have updated it as appropriate.     Medications:     Current Outpatient Medications:     apixaban (Eliquis) 5 MG tablet tablet, TAKE 1 TABLET BY MOUTH 2 TIMES A DAY, Disp: 60 tablet, Rfl: 5    metoprolol tartrate (LOPRESSOR) 50 MG tablet, Take 1 tablet by mouth 2 (Two) Times a Day., Disp: 180 tablet, Rfl: 3    Allergies:   Allergies   Allergen Reactions    Diltiazem Itching and Rash     Rash along vein of IV; itching    Morphine And Codeine GI Intolerance     SEVERE    Sulfa  Antibiotics GI Intolerance     SEVERE       Objective     Physical Exam: Please see above  Vital Signs:   Vitals:    06/25/24 0911   BP: 138/78   BP Location: Right arm   Patient Position: Sitting   Cuff Size: Adult   Pulse: 76   Resp: 18   Temp: 97.8 °F (36.6 °C)   TempSrc: Temporal   Weight: 66.9 kg (147 lb 6.4 oz)   PainSc: 0-No pain     Body mass index is 25.79 kg/m².          Physical Exam  Vitals and nursing note reviewed.   Constitutional:       General: She is not in acute distress.     Appearance: Normal appearance. She is normal weight. She is not ill-appearing or toxic-appearing.   HENT:      Nose: No congestion or rhinorrhea.   Eyes:      General:         Right eye: No discharge.         Left eye: No discharge.      Conjunctiva/sclera: Conjunctivae normal.   Pulmonary:      Effort: Pulmonary effort is normal. No respiratory distress.   Abdominal:      General: Abdomen is flat. There is no distension.   Musculoskeletal:      Cervical back: Normal range of motion.   Skin:     Coloration: Skin is not jaundiced.      Findings: No rash.   Neurological:      General: No focal deficit present.      Mental Status: She is alert. Mental status is at baseline.      Coordination: Coordination normal.      Gait: Gait normal.   Psychiatric:         Mood and Affect: Mood normal.         Behavior: Behavior normal.         Thought Content: Thought content normal.         Judgment: Judgment normal.         Procedures    Results:   Results  Imaging  CT lung cancer screening showed mild emphysema or COPD.    Labs:   TSH   Date Value Ref Range Status   03/08/2024 1.900 0.270 - 4.200 uIU/mL Final        Imaging:   No valid procedures specified.     Assessment / Plan      Assessment/Plan:   Problem List Items Addressed This Visit       History of tobacco use    Overview     4/25/2024: Negative CT lung cancer screening with only mild emphysema         Anxiety and depression    Other emphysema - Primary    Overview     PFT in  4/2024: no COPD, mild decrease in DLCO         Atrial fibrillation with RVR    Overview     New onset 3/6, after shingles and RSV vaccines         Severe mitral regurgitation    Onychomycosis       Assessment & Plan  1. Mitral valve regurgitation.  Noted as severe with previous echo.  Patient is continuing to follow with interventional cardiology for surveillance.  No additional interventions indicated at this time    2. Atrial fibrillation.  Patient counseled regarding atrial fibrillation symptoms and to continue with current metoprolol dose.  Patient was counseled that if blood pressure continues to remain hypotensive, to contact our clinic to discuss with cardiology regarding next [facility] management by potentially switching to metoprolol succinate for downgrading her dose.    3. Hypertension.  A low-salt diet has been recommended.    4.   onychomycosis  Approximately 25% of bilateral first toe involvement.  Patient given appropriate home precautions and told to follow-up if worsening to consider terbinafine and fluconazole.    Follow-up  A follow-up appointment is scheduled for 12/10/2024.    Follow Up:   Return in about 24 weeks (around 12/10/2024) for Medicare Wellness.    I spent 30 minutes caring for Kim on this date of service. This time includes time spent by me in the following activities: preparing for the visit, reviewing tests, performing a medically appropriate examination and/or evaluation, counseling and educating the patient/family/caregiver, referring and communicating with other health care professionals, documenting information in the medical record, independently interpreting results and communicating that information with the patient/family/caregiver, and care coordination.     Patient or patient representative verbalized consent for the use of Ambient Listening during the visit with  Robbie Koehler MD for chart documentation. 6/25/2024  13:00 EDT    Robbie Koehler MD  [facility]

## 2024-06-25 NOTE — PATIENT INSTRUCTIONS
"DASH Eating Plan  DASH stands for Dietary Approaches to Stop Hypertension. The DASH eating plan is a healthy eating plan that has been shown to:  Reduce high blood pressure (hypertension).  Reduce your risk for type 2 diabetes, heart disease, and stroke.  Help with weight loss.  What are tips for following this plan?  Reading food labels  Check food labels for the amount of salt (sodium) per serving. Choose foods with less than 5 percent of the Daily Value of sodium. Generally, foods with less than 300 milligrams (mg) of sodium per serving fit into this eating plan.  To find whole grains, look for the word \"whole\" as the first word in the ingredient list.  Shopping  Buy products labeled as \"low-sodium\" or \"no salt added.\"  Buy fresh foods. Avoid canned foods and pre-made or frozen meals.  Cooking  Avoid adding salt when cooking. Use salt-free seasonings or herbs instead of table salt or sea salt. Check with your health care provider or pharmacist before using salt substitutes.  Do not ayala foods. Cook foods using healthy methods such as baking, boiling, grilling, roasting, and broiling instead.  Cook with heart-healthy oils, such as olive, canola, avocado, soybean, or sunflower oil.  Meal planning  Eat a balanced diet that includes:  4 or more servings of fruits and 4 or more servings of vegetables each day. Try to fill one-half of your plate with fruits and vegetables.  6-8 servings of whole grains each day.  Less than 6 oz (170 g) of lean meat, poultry, or fish each day. A 3-oz (85-g) serving of meat is about the same size as a deck of cards. One egg equals 1 oz (28 g).  2-3 servings of low-fat dairy each day. One serving is 1 cup (237 mL).  1 serving of nuts, seeds, or beans 5 times each week.  2-3 servings of heart-healthy fats. Healthy fats called omega-3 fatty acids are found in foods such as walnuts, flaxseeds, fortified milks, and eggs. These fats are also found in cold-water fish, such as sardines, salmon, " and mackerel.  Limit how much you eat of:  Canned or prepackaged foods.  Food that is high in trans fat, such as some fried foods.  Food that is high in saturated fat, such as fatty meat.  Desserts and other sweets, sugary drinks, and other foods with added sugar.  Full-fat dairy products.  Do not salt foods before eating.  Do not eat more than 4 egg yolks a week.  Try to eat at least 2 vegetarian meals a week.  Eat more home-cooked food and less restaurant, buffet, and fast food.  Lifestyle  When eating at a restaurant, ask that your food be prepared with less salt or no salt, if possible.  If you drink alcohol:  Limit how much you use to:  0-1 drink a day for women who are not pregnant.  0-2 drinks a day for men.  Be aware of how much alcohol is in your drink. In the [place] one drink equals one 12 oz bottle of beer (355 mL), one 5 oz glass of wine (148 mL), or one 1½ oz glass of hard liquor (44 mL).  General information  Avoid eating more than 2,300 mg of salt a day. If you have hypertension, you may need to reduce your sodium intake to 1,500 mg a day.  Work with your health care provider to maintain a healthy body weight or to lose weight. Ask what an ideal weight is for you.  Get at least 30 minutes of exercise that causes your heart to beat faster (aerobic exercise) most days of the week. Activities may include walking, swimming, or biking.  Work with your health care provider or dietitian to adjust your eating plan to your individual calorie needs.  What foods should I eat?  Fruits  All fresh, dried, or frozen fruit. Canned fruit in natural juice (without added sugar).  Vegetables  Fresh or frozen vegetables (raw, steamed, roasted, or grilled). Low-sodium or reduced-sodium tomato and vegetable juice. Low-sodium or reduced-sodium tomato sauce and tomato paste. Low-sodium or reduced-sodium canned vegetables.  Grains  Whole-grain or whole-wheat bread. Whole-grain or whole-wheat pasta. Brown rice. Oatmeal. Quinoa.  Bulgur. Whole-grain and low-sodium cereals. Janee bread. Low-fat, low-sodium crackers. Whole-wheat flour tortillas.  Meats and other proteins  Skinless chicken or turkey. Ground chicken or turkey. Pork with fat trimmed off. Fish and seafood. Egg whites. Dried beans, peas, or lentils. Unsalted nuts, nut butters, and seeds. Unsalted canned beans. Lean cuts of beef with fat trimmed off. Low-sodium, lean precooked or cured meat, such as sausages or meat loaves.  Dairy  Low-fat (1%) or fat-free (skim) milk. Reduced-fat, low-fat, or fat-free cheeses. Nonfat, low-sodium ricotta or cottage cheese. Low-fat or nonfat yogurt. Low-fat, low-sodium cheese.  Fats and oils  Soft margarine without trans fats. Vegetable oil. Reduced-fat, low-fat, or light mayonnaise and salad dressings (reduced-sodium). Canola, safflower, olive, avocado, soybean, and sunflower oils. Avocado.  Seasonings and condiments  Herbs. Spices. Seasoning mixes without salt.  Other foods  Unsalted popcorn and pretzels. Fat-free sweets.  The items listed above may not be a complete list of foods and beverages you can eat. Contact a dietitian for more information.  What foods should I avoid?  Fruits  Canned fruit in a light or heavy syrup. Fried fruit. Fruit in cream or butter sauce.  Vegetables  Creamed or fried vegetables. Vegetables in a cheese sauce. Regular canned vegetables (not low-sodium or reduced-sodium). Regular canned tomato sauce and paste (not low-sodium or reduced-sodium). Regular tomato and vegetable juice (not low-sodium or reduced-sodium). Pickles. Olives.  Grains  Baked goods made with fat, such as croissants, muffins, or some breads. Dry pasta or rice meal packs.  Meats and other proteins  Fatty cuts of meat. Ribs. Fried meat. Gloria. Bologna, salami, and other precooked or cured meats, such as sausages or meat loaves. Fat from the back of a pig (fatback). Bratwurst. Salted nuts and seeds. Canned beans with added salt. Canned or smoked fish.  Whole eggs or egg yolks. Chicken or turkey with skin.  Dairy  Whole or 2% milk, cream, and half-and-half. Whole or full-fat cream cheese. Whole-fat or sweetened yogurt. Full-fat cheese. Nondairy creamers. Whipped toppings. Processed cheese and cheese spreads.  Fats and oils  Butter. Stick margarine. Lard. Shortening. Ghee. Gloria fat. Tropical oils, such as coconut, palm kernel, or palm oil.  Seasonings and condiments  Onion salt, garlic salt, seasoned salt, table salt, and sea salt. Worcestershire sauce. Tartar sauce. Barbecue sauce. Teriyaki sauce. Soy sauce, including reduced-sodium. Steak sauce. Canned and packaged gravies. Fish sauce. Oyster sauce. Cocktail sauce. Store-bought horseradish. Ketchup. Mustard. Meat flavorings and tenderizers. Bouillon cubes. Hot sauces. Pre-made or packaged marinades. Pre-made or packaged taco seasonings. Relishes. Regular salad dressings.  Other foods  Salted popcorn and pretzels.  The items listed above may not be a complete list of foods and beverages you should avoid. Contact a dietitian for more information.  Where to find more information  National Heart, Lung, and Blood [place]: www.nhlbi.nih.gov  American Heart Association: www.heart.org  Academy of Nutrition and Dietetics: www.eatright.org  National Kidney Foundation: www.kidney.org  Summary  The DASH eating plan is a healthy eating plan that has been shown to reduce high blood pressure (hypertension). It may also reduce your risk for type 2 diabetes, heart disease, and stroke.  When on the DASH eating plan, aim to eat more fresh fruits and vegetables, whole grains, lean proteins, low-fat dairy, and heart-healthy fats.  With the DASH eating plan, you should limit salt (sodium) intake to 2,300 mg a day. If you have hypertension, you may need to reduce your sodium intake to 1,500 mg a day.  Work with your health care provider or dietitian to adjust your eating plan to your individual calorie needs.  This information is not  intended to replace advice given to you by your health care provider. Make sure you discuss any questions you have with your health care provider.  Document Revised: 11/20/2020 Document Reviewed: 11/20/2020  Elsevier Patient Education © 2022 Elsevier Inc.

## 2024-10-10 ENCOUNTER — PATIENT MESSAGE (OUTPATIENT)
Dept: INTERNAL MEDICINE | Facility: CLINIC | Age: 69
End: 2024-10-10
Payer: MEDICARE

## 2024-10-10 NOTE — TELEPHONE ENCOUNTER
From: Kim Watson  To: Robbie Koehler  Sent: 10/10/2024 8:57 AM EDT  Subject: Question about stomach pain    I occasionally experience stomach pain that moves into my chest and makes me nauseous. I throw up sometimes and then it stops. Should I be concerned or can we wait until I come in on Dec. 10,2024. Thank you for your time.

## 2024-11-20 ENCOUNTER — LAB (OUTPATIENT)
Dept: LAB | Facility: HOSPITAL | Age: 69
End: 2024-11-20
Payer: MEDICARE

## 2024-11-20 ENCOUNTER — OFFICE VISIT (OUTPATIENT)
Dept: CARDIOLOGY | Facility: CLINIC | Age: 69
End: 2024-11-20
Payer: MEDICARE

## 2024-11-20 VITALS
OXYGEN SATURATION: 97 % | DIASTOLIC BLOOD PRESSURE: 66 MMHG | WEIGHT: 147.2 LBS | HEIGHT: 63 IN | BODY MASS INDEX: 26.08 KG/M2 | SYSTOLIC BLOOD PRESSURE: 148 MMHG | HEART RATE: 44 BPM

## 2024-11-20 DIAGNOSIS — E78.2 MIXED HYPERLIPIDEMIA: ICD-10-CM

## 2024-11-20 DIAGNOSIS — I48.91 ATRIAL FIBRILLATION WITH RVR: ICD-10-CM

## 2024-11-20 DIAGNOSIS — R06.02 SHORTNESS OF BREATH: ICD-10-CM

## 2024-11-20 DIAGNOSIS — I34.0 SEVERE MITRAL REGURGITATION: Primary | ICD-10-CM

## 2024-11-20 PROCEDURE — 83880 ASSAY OF NATRIURETIC PEPTIDE: CPT

## 2024-11-20 PROCEDURE — 36415 COLL VENOUS BLD VENIPUNCTURE: CPT

## 2024-11-20 PROCEDURE — G2211 COMPLEX E/M VISIT ADD ON: HCPCS | Performed by: INTERNAL MEDICINE

## 2024-11-20 PROCEDURE — 99214 OFFICE O/P EST MOD 30 MIN: CPT | Performed by: INTERNAL MEDICINE

## 2024-11-20 RX ORDER — METOPROLOL TARTRATE 25 MG/1
25 TABLET, FILM COATED ORAL 2 TIMES DAILY
Qty: 180 TABLET | Refills: 3 | Status: SHIPPED | OUTPATIENT
Start: 2024-11-20

## 2024-11-20 NOTE — PROGRESS NOTES
[facility] Cardiology  Office visit  Kim Watson  1955  [phone]  There is no work phone number on file.    VISIT DATE:  11/20/2024    PCP: Robbie Koehler MD  [address]  [facility] [address]    CC:  Chief Complaint   Patient presents with    Atrial fibrillation with RVR       Previous cardiac studies and procedures:  March 2024 SENA with successful cardioversion    Left ventricular systolic function is low normal. Left ventricular ejection fraction appears to be 51 - 55%.    The left atrial cavity is moderately dilated.    The right atrial cavity is mildly  dilated.    Moderate to severe mitral valve regurgitation is present.     April 2024 Holter, 48-hour   Frequent PVCs, 6%.     May 2024 TTE    Left ventricular ejection fraction appears to be 66 - 70%.    Moderately reduced right ventricular systolic function noted.    The left atrial cavity is moderate to severely dilated.    Moderate mitral valve regurgitation is present.    Estimated right ventricular systolic pressure from tricuspid regurgitation is normal (<35 mmHg).    ASSESSMENT:   Diagnosis Plan   1. Severe mitral regurgitation        2. Mixed hyperlipidemia        3. Atrial fibrillation with RVR            PLAN:  Persistent atrial fibrillation: Maintaining sinus rhythm.  Chads Vasc equal to 2.  Continue Eliquis 5 mg p.o. twice daily.  Continue beta-blockade for rate control, decreasing metoprolol to 25 mg p.o. twice daily.    Mitral insufficiency: Moderate.  Repeat transthoracic echocardiogram evaluation pending in 6 months.  proBNP pending today, baseline.    PVCs: Asymptomatic.  Continue beta-blockade.      Subjective  Denies chest pain, palpitations or dyspnea on exertion.  Blood pressures running less than 130/80 mmHg.  She is compliant with medical therapy.    PHYSICAL EXAMINATION:  Vitals:    11/20/24 1413   BP: 148/66   BP Location: Right arm   Patient Position: Sitting   Cuff Size:  "Adult   Pulse: (!) 44   SpO2: 97%   Weight: 66.8 kg (147 lb 3.2 oz)   Height: 161 cm (63.39\")     General Appearance:    Alert, cooperative, no distress, appears stated age   Head:    Normocephalic, without obvious abnormality, atraumatic   Eyes:    conjunctiva/corneas clear   Nose:   Nares normal, septum midline, mucosa normal, no drainage   Throat:   Lips, teeth and gums normal   Neck:   Supple, symmetrical, trachea midline, no carotid    bruit or JVD   Lungs:     Clear to auscultation bilaterally, respirations unlabored   Chest Wall:    No tenderness or deformity    Heart:  Bradycardia, S1 and S2 normal, no murmur, rub   or gallop, normal carotid impulse bilaterally without bruit.   Abdomen:     Soft, non-tender   Extremities:   Extremities normal, atraumatic, no cyanosis or edema   Pulses:   2+ and symmetric all extremities   Skin:   Skin color, texture, turgor normal, no rashes or lesions       Diagnostic Data:  Procedures  Lab Results   Component Value Date    TRIG 64 12/07/2023    HDL 67 (H) 12/07/2023     Lab Results   Component Value Date    GLUCOSE 110 (H) 03/08/2024    BUN 12 03/08/2024    CREATININE 0.83 03/08/2024     03/08/2024    K 4.1 03/08/2024     03/08/2024    CO2 20.0 (L) 03/08/2024     No results found for: \"HGBA1C\"  Lab Results   Component Value Date    WBC 6.63 03/08/2024    HGB 14.3 03/08/2024    HCT 43.5 03/08/2024     03/08/2024       Allergies  Allergies   Allergen Reactions    Diltiazem Itching and Rash     Rash along vein of IV; itching    Morphine And Codeine GI Intolerance     SEVERE    Sulfa Antibiotics GI Intolerance     SEVERE       Current Medications    Current Outpatient Medications:     apixaban (Eliquis) 5 MG tablet tablet, TAKE 1 TABLET BY MOUTH 2 TIMES A DAY, Disp: 60 tablet, Rfl: 5    metoprolol tartrate (LOPRESSOR) 50 MG tablet, Take 1 tablet by mouth 2 (Two) Times a Day., Disp: 180 tablet, Rfl: 3          ROS  ROS      SOCIAL HX  Social History "     Socioeconomic History    Marital status:    Tobacco Use    Smoking status: Former     Current packs/day: 0.00     Average packs/day: 1.5 packs/day for 44.8 years (67.2 ttl pk-yrs)     Types: Cigarettes, Electronic Cigarette     Start date: 1974     Quit date: 10/15/2018     Years since quittin.1    Smokeless tobacco: Never   Vaping Use    Vaping status: Former   Substance and Sexual Activity    Alcohol use: Yes     Comment: 1 or 2 ready to drink margaritas 5 or 6 times a week    Drug use: No    Sexual activity: Not Currently     Partners: Male       FAMILY HX  Family History   Problem Relation Age of Onset    Arrhythmia Mother         pacemaker    Arthritis Mother     Migraines Mother     COPD Mother     Asthma Mother     Fainting Mother         Mini strokes    Arrhythmia Father         pacemaker    Cancer Father         Bladder cancer    Alzheimer's disease Father     Diabetes Sister     Diabetes Brother     Migraines Brother     Alcohol abuse Brother         Alcoholic    No Known Problems Brother     No Known Problems Brother     No Known Problems Maternal Grandmother     Lung disease Paternal Grandmother     Alzheimer's disease Maternal Grandfather     No Known Problems Paternal Grandfather     Breast cancer Neg Hx              Jimbo Jo III, MD, FACC

## 2024-11-21 LAB — NT-PROBNP SERPL-MCNC: 675 PG/ML (ref 0–900)

## 2024-12-10 ENCOUNTER — OFFICE VISIT (OUTPATIENT)
Dept: INTERNAL MEDICINE | Facility: CLINIC | Age: 69
End: 2024-12-10
Payer: MEDICARE

## 2024-12-10 VITALS
RESPIRATION RATE: 18 BRPM | DIASTOLIC BLOOD PRESSURE: 68 MMHG | BODY MASS INDEX: 25.33 KG/M2 | HEIGHT: 64 IN | WEIGHT: 148.4 LBS | HEART RATE: 74 BPM | SYSTOLIC BLOOD PRESSURE: 132 MMHG | TEMPERATURE: 97.3 F

## 2024-12-10 DIAGNOSIS — I34.0 SEVERE MITRAL REGURGITATION: ICD-10-CM

## 2024-12-10 DIAGNOSIS — E78.2 MIXED HYPERLIPIDEMIA: ICD-10-CM

## 2024-12-10 DIAGNOSIS — R10.13 EPIGASTRIC ABDOMINAL PAIN: ICD-10-CM

## 2024-12-10 DIAGNOSIS — Z23 ENCOUNTER FOR VACCINATION: ICD-10-CM

## 2024-12-10 DIAGNOSIS — F32.A ANXIETY AND DEPRESSION: ICD-10-CM

## 2024-12-10 DIAGNOSIS — K21.00 GASTROESOPHAGEAL REFLUX DISEASE WITH ESOPHAGITIS WITHOUT HEMORRHAGE: ICD-10-CM

## 2024-12-10 DIAGNOSIS — F41.9 ANXIETY AND DEPRESSION: ICD-10-CM

## 2024-12-10 DIAGNOSIS — Z00.00 MEDICARE ANNUAL WELLNESS VISIT, SUBSEQUENT: Primary | ICD-10-CM

## 2024-12-10 DIAGNOSIS — A04.8 H. PYLORI INFECTION: ICD-10-CM

## 2024-12-10 DIAGNOSIS — J43.8 OTHER EMPHYSEMA: ICD-10-CM

## 2024-12-10 DIAGNOSIS — Z13.9 ENCOUNTER FOR SCREENING: ICD-10-CM

## 2024-12-10 DIAGNOSIS — I48.91 ATRIAL FIBRILLATION WITH RVR: ICD-10-CM

## 2024-12-10 DIAGNOSIS — G43.009 MIGRAINE WITHOUT AURA AND WITHOUT STATUS MIGRAINOSUS, NOT INTRACTABLE: ICD-10-CM

## 2024-12-10 DIAGNOSIS — Z78.0 POSTMENOPAUSE: ICD-10-CM

## 2024-12-10 DIAGNOSIS — Z12.31 ENCOUNTER FOR SCREENING MAMMOGRAM FOR MALIGNANT NEOPLASM OF BREAST: ICD-10-CM

## 2024-12-10 PROBLEM — B35.1 ONYCHOMYCOSIS: Status: RESOLVED | Noted: 2024-06-25 | Resolved: 2024-12-10

## 2024-12-10 LAB
ALBUMIN SERPL-MCNC: 4.3 G/DL (ref 3.5–5.2)
ALBUMIN/GLOB SERPL: 1.6 G/DL
ALP SERPL-CCNC: 85 U/L (ref 39–117)
ALT SERPL W P-5'-P-CCNC: 44 U/L (ref 1–33)
ANION GAP SERPL CALCULATED.3IONS-SCNC: 10 MMOL/L (ref 5–15)
AST SERPL-CCNC: 33 U/L (ref 1–32)
BASOPHILS # BLD AUTO: 0.05 10*3/MM3 (ref 0–0.2)
BASOPHILS NFR BLD AUTO: 0.8 % (ref 0–1.5)
BILIRUB SERPL-MCNC: 0.4 MG/DL (ref 0–1.2)
BUN SERPL-MCNC: 12 MG/DL (ref 8–23)
BUN/CREAT SERPL: 14.5 (ref 7–25)
CALCIUM SPEC-SCNC: 10 MG/DL (ref 8.6–10.5)
CHLORIDE SERPL-SCNC: 104 MMOL/L (ref 98–107)
CHOLEST SERPL-MCNC: 201 MG/DL (ref 0–200)
CO2 SERPL-SCNC: 27 MMOL/L (ref 22–29)
CREAT SERPL-MCNC: 0.83 MG/DL (ref 0.57–1)
CRP SERPL-MCNC: <0.3 MG/DL (ref 0–0.5)
DEPRECATED RDW RBC AUTO: 43.2 FL (ref 37–54)
EGFRCR SERPLBLD CKD-EPI 2021: 77.4 ML/MIN/1.73
EOSINOPHIL # BLD AUTO: 0.19 10*3/MM3 (ref 0–0.4)
EOSINOPHIL NFR BLD AUTO: 3 % (ref 0.3–6.2)
ERYTHROCYTE [DISTWIDTH] IN BLOOD BY AUTOMATED COUNT: 12.5 % (ref 12.3–15.4)
GLOBULIN UR ELPH-MCNC: 2.7 GM/DL
GLUCOSE SERPL-MCNC: 89 MG/DL (ref 65–99)
HCT VFR BLD AUTO: 43.5 % (ref 34–46.6)
HDLC SERPL-MCNC: 55 MG/DL (ref 40–60)
HGB BLD-MCNC: 14.6 G/DL (ref 12–15.9)
IMM GRANULOCYTES # BLD AUTO: 0.03 10*3/MM3 (ref 0–0.05)
IMM GRANULOCYTES NFR BLD AUTO: 0.5 % (ref 0–0.5)
LDLC SERPL CALC-MCNC: 121 MG/DL (ref 0–100)
LDLC/HDLC SERPL: 2.13 {RATIO}
LIPASE SERPL-CCNC: 58 U/L (ref 13–60)
LYMPHOCYTES # BLD AUTO: 2.18 10*3/MM3 (ref 0.7–3.1)
LYMPHOCYTES NFR BLD AUTO: 34.8 % (ref 19.6–45.3)
MCH RBC QN AUTO: 31.9 PG (ref 26.6–33)
MCHC RBC AUTO-ENTMCNC: 33.6 G/DL (ref 31.5–35.7)
MCV RBC AUTO: 95.2 FL (ref 79–97)
MONOCYTES # BLD AUTO: 0.48 10*3/MM3 (ref 0.1–0.9)
MONOCYTES NFR BLD AUTO: 7.7 % (ref 5–12)
NEUTROPHILS NFR BLD AUTO: 3.33 10*3/MM3 (ref 1.7–7)
NEUTROPHILS NFR BLD AUTO: 53.2 % (ref 42.7–76)
NRBC BLD AUTO-RTO: 0 /100 WBC (ref 0–0.2)
PLATELET # BLD AUTO: 305 10*3/MM3 (ref 140–450)
PMV BLD AUTO: 10.9 FL (ref 6–12)
POTASSIUM SERPL-SCNC: 4.3 MMOL/L (ref 3.5–5.2)
PROT SERPL-MCNC: 7 G/DL (ref 6–8.5)
RBC # BLD AUTO: 4.57 10*6/MM3 (ref 3.77–5.28)
SODIUM SERPL-SCNC: 141 MMOL/L (ref 136–145)
TRIGL SERPL-MCNC: 144 MG/DL (ref 0–150)
VLDLC SERPL-MCNC: 25 MG/DL (ref 5–40)
WBC NRBC COR # BLD AUTO: 6.26 10*3/MM3 (ref 3.4–10.8)

## 2024-12-10 PROCEDURE — 99214 OFFICE O/P EST MOD 30 MIN: CPT | Performed by: STUDENT IN AN ORGANIZED HEALTH CARE EDUCATION/TRAINING PROGRAM

## 2024-12-10 PROCEDURE — 1126F AMNT PAIN NOTED NONE PRSNT: CPT | Performed by: STUDENT IN AN ORGANIZED HEALTH CARE EDUCATION/TRAINING PROGRAM

## 2024-12-10 PROCEDURE — 85025 COMPLETE CBC W/AUTO DIFF WBC: CPT | Performed by: STUDENT IN AN ORGANIZED HEALTH CARE EDUCATION/TRAINING PROGRAM

## 2024-12-10 PROCEDURE — 86140 C-REACTIVE PROTEIN: CPT | Performed by: STUDENT IN AN ORGANIZED HEALTH CARE EDUCATION/TRAINING PROGRAM

## 2024-12-10 PROCEDURE — 83690 ASSAY OF LIPASE: CPT | Performed by: STUDENT IN AN ORGANIZED HEALTH CARE EDUCATION/TRAINING PROGRAM

## 2024-12-10 PROCEDURE — 36415 COLL VENOUS BLD VENIPUNCTURE: CPT | Performed by: STUDENT IN AN ORGANIZED HEALTH CARE EDUCATION/TRAINING PROGRAM

## 2024-12-10 PROCEDURE — G0008 ADMIN INFLUENZA VIRUS VAC: HCPCS | Performed by: STUDENT IN AN ORGANIZED HEALTH CARE EDUCATION/TRAINING PROGRAM

## 2024-12-10 PROCEDURE — 80053 COMPREHEN METABOLIC PANEL: CPT | Performed by: STUDENT IN AN ORGANIZED HEALTH CARE EDUCATION/TRAINING PROGRAM

## 2024-12-10 PROCEDURE — 1160F RVW MEDS BY RX/DR IN RCRD: CPT | Performed by: STUDENT IN AN ORGANIZED HEALTH CARE EDUCATION/TRAINING PROGRAM

## 2024-12-10 PROCEDURE — 1159F MED LIST DOCD IN RCRD: CPT | Performed by: STUDENT IN AN ORGANIZED HEALTH CARE EDUCATION/TRAINING PROGRAM

## 2024-12-10 PROCEDURE — 80061 LIPID PANEL: CPT | Performed by: STUDENT IN AN ORGANIZED HEALTH CARE EDUCATION/TRAINING PROGRAM

## 2024-12-10 PROCEDURE — G0439 PPPS, SUBSEQ VISIT: HCPCS | Performed by: STUDENT IN AN ORGANIZED HEALTH CARE EDUCATION/TRAINING PROGRAM

## 2024-12-10 PROCEDURE — 90662 IIV NO PRSV INCREASED AG IM: CPT | Performed by: STUDENT IN AN ORGANIZED HEALTH CARE EDUCATION/TRAINING PROGRAM

## 2024-12-10 RX ORDER — FAMOTIDINE 20 MG/1
20 TABLET, FILM COATED ORAL 2 TIMES DAILY
Qty: 60 TABLET | Refills: 1 | Status: SHIPPED | OUTPATIENT
Start: 2024-12-10 | End: 2024-12-12
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
## 2024-12-10 NOTE — PATIENT INSTRUCTIONS
Mindfulness: [employer] on your phone    Health Maintenance for Postmenopausal Women  Menopause is a normal process in which your reproductive ability comes to an end. This process happens gradually over a span of months to years, usually between the ages of 48 and 55. Menopause is complete when you have missed 12 consecutive menstrual periods.  It is important to talk with your health care provider about some of the most common conditions that affect postmenopausal women, such as heart disease, cancer, and bone loss (osteoporosis). Adopting a healthy lifestyle and getting preventive care can help to promote your health and wellness. Those actions can also lower your chances of developing some of these common conditions.  What should I know about menopause?  During menopause, you may experience a number of symptoms, such as:  Moderate-to-severe hot flashes.  Night sweats.  Decrease in sex drive.  Mood swings.  Headaches.  Tiredness.  Irritability.  Memory problems.  Insomnia.     Choosing to treat or not to treat menopausal changes is an individual decision that you make with your health care provider.  What should I know about hormone replacement therapy and supplements?  Hormone therapy products are effective for treating symptoms that are associated with menopause, such as hot flashes and night sweats. Hormone replacement carries certain risks, especially as you become older. If you are thinking about using estrogen or estrogen with progestin treatments, discuss the benefits and risks with your health care provider.  What should I know about heart disease and stroke?  Heart disease, heart attack, and stroke become more likely as you age. This may be due, in part, to the hormonal changes that your body experiences during menopause. These can affect how your body processes dietary fats, triglycerides, and cholesterol. Heart attack and stroke are both medical emergencies.    There are many things that you can do  to help prevent heart disease and stroke:  Have your blood pressure checked at least every 1-2 years. High blood pressure causes heart disease and increases the risk of stroke.  If you are 55-79 years old, ask your health care provider if you should take aspirin to prevent a heart attack or a stroke.  Do not use any tobacco products, including cigarettes, chewing tobacco, or electronic cigarettes. If you need help quitting, ask your health care provider.  It is important to eat a healthy diet and maintain a healthy weight.  Be sure to include plenty of vegetables, fruits, low-fat dairy products, and lean protein.  Avoid eating foods that are high in solid fats, added sugars, or salt (sodium).  Get regular exercise. This is one of the most important things that you can do for your health.  Try to exercise for at least 150 minutes each week. The type of exercise that you do should increase your heart rate and make you sweat. This is known as moderate-intensity exercise.  Try to do strengthening exercises at least twice each week. Do these in addition to the moderate-intensity exercise.  Know your numbers. Ask your health care provider to check your cholesterol and your blood glucose. Continue to have your blood tested as directed by your health care provider.     What should I know about cancer screening?  There are several types of cancer. Take the following steps to reduce your risk and to catch any cancer development as early as possible.  Breast Cancer  Practice breast self-awareness.  This means understanding how your breasts normally appear and feel.  It also means doing regular breast self-exams. Let your health care provider know about any changes, no matter how small.  If you are 40 or older, have a clinician do a breast exam (clinical breast exam or CBE) every year. Depending on your age, family history, and medical history, it may be recommended that you also have a yearly breast X-ray (mammogram).  If you  have a family history of breast cancer, talk with your health care provider about genetic screening.  If you are at high risk for breast cancer, talk with your health care provider about having an MRI and a mammogram every year.  Breast cancer (BRCA) gene test is recommended for women who have family members with BRCA-related cancers. Results of the assessment will determine the need for genetic counseling and BRCA1 and for BRCA2 testing. BRCA-related cancers include these types:  Breast. This occurs in males or females.  Ovarian.  Tubal. This may also be called fallopian tube cancer.  Cancer of the abdominal or pelvic lining (peritoneal cancer).  Prostate.  Pancreatic.     Cervical, Uterine, and Ovarian Cancer  Your health care provider may recommend that you be screened regularly for cancer of the pelvic organs. These include your ovaries, uterus, and vagina. This screening involves a pelvic exam, which includes checking for microscopic changes to the surface of your cervix (Pap test).  For women ages 21-65, health care providers may recommend a pelvic exam and a Pap test every three years. For women ages 30-65, they may recommend the Pap test and pelvic exam, combined with testing for human papilloma virus (HPV), every five years. Some types of HPV increase your risk of cervical cancer. Testing for HPV may also be done on women of any age who have unclear Pap test results.  Other health care providers may not recommend any screening for nonpregnant women who are considered low risk for pelvic cancer and have no symptoms. Ask your health care provider if a screening pelvic exam is right for you.  If you have had past treatment for cervical cancer or a condition that could lead to cancer, you need Pap tests and screening for cancer for at least 20 years after your treatment. If Pap tests have been discontinued for you, your risk factors (such as having a new sexual partner) need to be reassessed to determine if you  should start having screenings again. Some women have medical problems that increase the chance of getting cervical cancer. In these cases, your health care provider may recommend that you have screening and Pap tests more often.  If you have a family history of uterine cancer or ovarian cancer, talk with your health care provider about genetic screening.  If you have vaginal bleeding after reaching menopause, tell your health care provider.  There are currently no reliable tests available to screen for ovarian cancer.     Lung Cancer  Lung cancer screening is recommended for adults 55-80 years old who are at high risk for lung cancer because of a history of smoking. A yearly low-dose CT scan of the lungs is recommended if you:  Currently smoke.  Have a history of at least 30 pack-years of smoking and you currently smoke or have quit within the past 15 years. A pack-year is smoking an average of one pack of cigarettes per day for one year.     Yearly screening should:  Continue until it has been 15 years since you quit.  Stop if you develop a health problem that would prevent you from having lung cancer treatment.     Colorectal Cancer  This type of cancer can be detected and can often be prevented.  Routine colorectal cancer screening usually begins at age 50 and continues through age 75.  If you have risk factors for colon cancer, your health care provider may recommend that you be screened at an earlier age.  If you have a family history of colorectal cancer, talk with your health care provider about genetic screening.  Your health care provider may also recommend using home test kits to check for hidden blood in your stool.  A small camera at the end of a tube can be used to examine your colon directly (sigmoidoscopy or colonoscopy). This is done to check for the earliest forms of colorectal cancer.  Direct examination of the colon should be repeated every 5-10 years until age 75. However, if early forms of  precancerous polyps or small growths are found or if you have a family history or genetic risk for colorectal cancer, you may need to be screened more often.     Skin Cancer  Check your skin from head to toe regularly.  Monitor any moles. Be sure to tell your health care provider:  About any new moles or changes in moles, especially if there is a change in a mole's shape or color.  If you have a mole that is larger than the size of a pencil eraser.  If any of your family members has a history of skin cancer, especially at a young age, talk with your health care provider about genetic screening.  Always use sunscreen. Apply sunscreen liberally and repeatedly throughout the day.  Whenever you are outside, protect yourself by wearing long sleeves, pants, a wide-brimmed hat, and sunglasses.     What should I know about osteoporosis?  Osteoporosis is a condition in which bone destruction happens more quickly than new bone creation. After menopause, you may be at an increased risk for osteoporosis. To help prevent osteoporosis or the bone fractures that can happen because of osteoporosis, the following is recommended:  If you are 19-50 years old, get at least 1,000 mg of calcium and at least 600 mg of vitamin D per day.  If you are older than age 50 but younger than age 70, get at least 1,200 mg of calcium and at least 600 mg of vitamin D per day.  If you are older than age 70, get at least 1,200 mg of calcium and at least 800 mg of vitamin D per day.     Smoking and excessive alcohol intake increase the risk of osteoporosis. Eat foods that are rich in calcium and vitamin D, and do weight-bearing exercises several times each week as directed by your health care provider.  What should I know about how menopause affects my mental health?  Depression may occur at any age, but it is more common as you become older. Common symptoms of depression include:  Low or sad mood.  Changes in sleep patterns.  Changes in appetite or  eating patterns.  Feeling an overall lack of motivation or enjoyment of activities that you previously enjoyed.  Frequent crying spells.     Talk with your health care provider if you think that you are experiencing depression.  What should I know about immunizations?  It is important that you get and maintain your immunizations. These include:  Tetanus, diphtheria, and pertussis (Tdap) booster vaccine.  Influenza every year before the flu season begins.  Pneumonia vaccine.  Shingles vaccine.     Your health care provider may also recommend other immunizations.  This information is not intended to replace advice given to you by your health care provider. Make sure you discuss any questions you have with your health care provider.  Document Released: 02/09/2007 Document Revised: 07/07/2017 Document Reviewed: 09/20/2016  [employer] Interactive Patient Education © 2018 [employer]            Healthy Delicious Recipes from [employer]: https://[employer].org/  English Plant Based Meal Recipes: https://Accredible/  Heart Healthy Recipes from [employer]: https://abcardio.org/wp-content/uploads/2020/06/ABC_Cookbook.pdf  [place] Healthy Living Guide: https://www.[facility].[place].edu/nutritionsource/2022/01/06/healthy-living-guide-3037-3876/  [employer] Cookbook for Budgets: http://ongov.net/dss/documents/good-and-cheap.pdf

## 2024-12-11 ENCOUNTER — LAB (OUTPATIENT)
Dept: INTERNAL MEDICINE | Facility: CLINIC | Age: 69
End: 2024-12-11
Payer: MEDICARE

## 2024-12-11 DIAGNOSIS — R10.13 EPIGASTRIC ABDOMINAL PAIN: ICD-10-CM

## 2024-12-11 PROCEDURE — 87338 HPYLORI STOOL AG IA: CPT | Performed by: STUDENT IN AN ORGANIZED HEALTH CARE EDUCATION/TRAINING PROGRAM

## 2024-12-11 PROCEDURE — 83993 ASSAY FOR CALPROTECTIN FECAL: CPT | Performed by: STUDENT IN AN ORGANIZED HEALTH CARE EDUCATION/TRAINING PROGRAM

## 2024-12-12 LAB — H PYLORI AG STL QL IA: POSITIVE

## 2024-12-12 RX ORDER — AMOXICILLIN 500 MG/1
1000 CAPSULE ORAL 2 TIMES DAILY
Qty: 56 CAPSULE | Refills: 0 | Status: SHIPPED | OUTPATIENT
Start: 2024-12-12 | End: 2024-12-26

## 2024-12-12 RX ORDER — OMEPRAZOLE 40 MG/1
40 CAPSULE, DELAYED RELEASE ORAL 2 TIMES DAILY
Qty: 28 CAPSULE | Refills: 0 | Status: SHIPPED | OUTPATIENT
Start: 2024-12-12 | End: 2024-12-26

## 2024-12-12 RX ORDER — CLARITHROMYCIN 500 MG/1
500 TABLET ORAL 2 TIMES DAILY
Qty: 28 TABLET | Refills: 0 | Status: SHIPPED | OUTPATIENT
Start: 2024-12-12 | End: 2024-12-26

## 2024-12-13 LAB — CALPROTECTIN STL-MCNT: 7 UG/G (ref 0–120)

## 2024-12-23 ENCOUNTER — TELEPHONE (OUTPATIENT)
Dept: INTERNAL MEDICINE | Facility: CLINIC | Age: 69
End: 2024-12-23
Payer: MEDICARE

## 2024-12-23 DIAGNOSIS — A04.8 H. PYLORI INFECTION: ICD-10-CM

## 2024-12-23 RX ORDER — OMEPRAZOLE 40 MG/1
CAPSULE, DELAYED RELEASE ORAL
Qty: 28 CAPSULE | Refills: 0 | Status: SHIPPED | OUTPATIENT
Start: 2024-12-23

## 2024-12-23 NOTE — TELEPHONE ENCOUNTER
We are actually going to plan on rechecking for eradication of the H. pylori infection approximately 1 month after starting the treatment regimen.  For this, we would need to have her not take the omeprazole for approximately 2 weeks prior to testing this again to ensure that the test is accurate.  If she is having continued symptoms prior to retesting 1 month after starting the treatment regimen, we can consider famotidine, but I would actually have her continue the omeprazole for only 2 weeks total and then retesting at 1 month from treatment onset.  Let me know if additional clarification is needed.  Thanks.

## 2024-12-23 NOTE — TELEPHONE ENCOUNTER
On 12/12/24 Patient was started on omeprazole 40mg BID for H.Pylori. In message it says to take for 1 month then reevaluate. Rx was sent in for 14 day supply. Refill request was sent to us and a refill of 14 days was sent in. Patient is wondering if she needs to  that Rx and take it until her appointment with you 01/09/25. Or did you just want her to only do it for 14 days? Please advise

## 2024-12-23 NOTE — TELEPHONE ENCOUNTER
"  Caller: Kim Watson \"Shanell\"    Relationship to patient: Self    Best call back number: [phone]     Patient is needing: CLARIFICATION ON WHY ANOTHER PRESCRIPTION FOR     omeprazole (priLOSEC) 40 MG capsule    WAS CALLED IN TODAY, PLEASE CALL BACK TO LET PATIENT KNOW IF SHE NEEDS TO  THIS MEDICATION. PLEASE CLARIFY WHAT PATIENT IS SUPPOSED TO TAKE OR NOT TAKE.        "

## 2024-12-31 RX ORDER — APIXABAN 5 MG/1
5 TABLET, FILM COATED ORAL 2 TIMES DAILY
Qty: 60 TABLET | Refills: 5 | Status: SHIPPED | OUTPATIENT
Start: 2024-12-31

## 2025-01-09 ENCOUNTER — PROCEDURE VISIT (OUTPATIENT)
Dept: INTERNAL MEDICINE | Facility: CLINIC | Age: 70
End: 2025-01-09
Payer: MEDICARE

## 2025-01-09 VITALS
RESPIRATION RATE: 18 BRPM | WEIGHT: 146.6 LBS | TEMPERATURE: 97.1 F | BODY MASS INDEX: 25.34 KG/M2 | HEART RATE: 76 BPM | SYSTOLIC BLOOD PRESSURE: 136 MMHG | DIASTOLIC BLOOD PRESSURE: 66 MMHG

## 2025-01-09 DIAGNOSIS — L91.8 SKIN TAG: ICD-10-CM

## 2025-01-09 DIAGNOSIS — K76.9 LIVER DAMAGE: ICD-10-CM

## 2025-01-09 DIAGNOSIS — A04.8 H. PYLORI INFECTION: Primary | ICD-10-CM

## 2025-01-09 DIAGNOSIS — J43.8 OTHER EMPHYSEMA: ICD-10-CM

## 2025-01-09 DIAGNOSIS — E78.2 MIXED HYPERLIPIDEMIA: ICD-10-CM

## 2025-01-09 DIAGNOSIS — L98.9 SKIN LESION: ICD-10-CM

## 2025-01-09 DIAGNOSIS — R74.01 TRANSAMINITIS: ICD-10-CM

## 2025-01-09 DIAGNOSIS — I48.91 ATRIAL FIBRILLATION WITH RVR: ICD-10-CM

## 2025-01-09 RX ORDER — MUPIROCIN 20 MG/G
1 OINTMENT TOPICAL 3 TIMES DAILY
Qty: 22 G | Refills: 0 | Status: SHIPPED | OUTPATIENT
Start: 2025-01-09

## 2025-01-09 NOTE — PROGRESS NOTES
Follow Up Office Visit      Date: 2025   Patient Name: Kim Watson  : 1955   MRN: [id number]     Chief Complaint:    Chief Complaint   Patient presents with    Skin Problem     In office procedure        History of Present Illness: Kim Watson is a 69 y.o. female who is here today to follow up with chronic care management and in- office procedure.    History of Present Illness  The patient is a 69-year-old female who presents for a skin tag removal procedure visit.    Skin Tags  She has identified several skin tags that she wishes to have removed. She reports a tendency to pick at these lesions, particularly one that she finds bothersome. Additionally, she mentions a small bump on her skin. She has a history of dry skin and therefore, does not shower daily.  - Character: Several skin tags, tendency to pick at lesions, small bump on skin.  - Alleviating/Aggravating Factors: History of dry skin, does not shower daily.    GERD Symptoms  She has been managing her GERD symptoms with omeprazole, which she completed on 2024. She reports no pain but experiences occasional discomfort in her stomach, which resolves spontaneously. She has been using Tums as needed for relief. She is uncertain about the continuation of famotidine, as she has not yet finished the initial prescription despite being informed that a refill is available. She has not undergone any H. pylori rechecks since the first one.  - Onset: Managed with omeprazole, completed on 2024.  - Character: No pain, occasional stomach discomfort.  - Alleviating/Aggravating Factors: Tums as needed for relief, uncertain about continuation of famotidine.  - Timing: Occasional discomfort, resolves spontaneously.    Dietary Modifications  She has made dietary modifications, including reducing her intake of eggs and littlejohn, and limiting her consumption of saturated fats. She used to eat eggs every day for breakfast and now she eats  once a week.  - Character: Reduced intake of eggs and littlejohn, limiting saturated fats.  - Timing: Used to eat eggs every day, now eats once a week.    Smoking History  She has a history of smoking, with periods of cessation followed by relapse. In the last year of her smoking habit, she significantly reduced her intake and transitioned to vaping non-nicotine products. She reports no respiratory issues, even after spending two hours outdoors shoveling snow and de-icing her car.  - Onset: History of smoking with periods of cessation and relapse.  - Character: Significantly reduced intake, transitioned to vaping non-nicotine products.  - Alleviating/Aggravating Factors: No respiratory issues reported.  - Timing: Last year of smoking habit.    Cardiology Follow-Up  She continues to be under the care of a cardiologist, with an echocardiogram scheduled for May 2025 and a follow-up appointment in July 2025. She has discontinued home blood pressure monitoring due to the associated discomfort.  - Timing: Echocardiogram scheduled for May 2025, follow-up in July 2025.  - Alleviating/Aggravating Factors: Discontinued home blood pressure monitoring due to discomfort.    Supplemental Information  She has been prescribed a new topical medication by her dermatologist for application on her hands.    SOCIAL HISTORY  The patient quit smoking and has been vaping non-nicotine.    FAMILY HISTORY  The patient's mother had skin tags all over her neck.    MEDICATIONS  - Current: metoprolol, Eliquis, famotidine, Tums  - Discontinued: omeprazole      Subjective      Review of Systems:   Review of Systems   All other systems reviewed and are negative.      I have reviewed the patients family history, social history, past medical history, past surgical history and have updated it as appropriate.     Medications:     Current Outpatient Medications:     Eliquis 5 MG tablet tablet, TAKE 1 TABLET BY MOUTH 2 TIMES A DAY, Disp: 60 tablet, Rfl: 5     metoprolol tartrate (LOPRESSOR) 25 MG tablet, Take 1 tablet by mouth 2 (Two) Times a Day., Disp: 180 tablet, Rfl: 3    mupirocin (BACTROBAN) 2 % ointment, Apply 1 Application topically to the appropriate area as directed 3 (Three) Times a Day., Disp: 22 g, Rfl: 0    Allergies:   Allergies   Allergen Reactions    Diltiazem Itching and Rash     Rash along vein of IV; itching    Morphine And Codeine GI Intolerance     SEVERE    Sulfa Antibiotics GI Intolerance     SEVERE       Objective     Physical Exam: Please see above  Vital Signs:   Vitals:    01/09/25 1508   BP: 136/66   BP Location: Right arm   Patient Position: Sitting   Cuff Size: Adult   Pulse: 76   Resp: 18   Temp: 97.1 °F (36.2 °C)   TempSrc: Temporal   Weight: 66.5 kg (146 lb 9.6 oz)   PainSc: 0-No pain     Body mass index is 25.34 kg/m².      The 10-year ASCVD risk score (Angelic HERNANDEZ, et al., 2019) is: 7.7%    Values used to calculate the score:      Age: 69 years      Sex: Female      Is Non- : No      Diabetic: No      Tobacco smoker: No      Systolic Blood Pressure: 136 mmHg      Is BP treated: No      HDL Cholesterol: 55 mg/dL      Total Cholesterol: 201 mg/dL      Physical Exam  Vitals and nursing note reviewed.   Constitutional:       General: She is not in acute distress.     Appearance: Normal appearance. She is normal weight. She is not ill-appearing or toxic-appearing.   HENT:      Nose: No congestion or rhinorrhea.   Eyes:      General:         Right eye: No discharge.         Left eye: No discharge.      Conjunctiva/sclera: Conjunctivae normal.   Pulmonary:      Effort: Pulmonary effort is normal. No respiratory distress.   Abdominal:      General: Abdomen is flat. There is no distension.   Musculoskeletal:      Cervical back: Normal range of motion.   Skin:     Coloration: Skin is not jaundiced.      Findings: Lesion present. No rash.   Neurological:      General: No focal deficit present.      Mental Status: She is  alert. Mental status is at baseline.      Coordination: Coordination normal.      Gait: Gait normal.   Psychiatric:         Mood and Affect: Mood normal.         Behavior: Behavior normal.         Thought Content: Thought content normal.         Judgment: Judgment normal.         Biopsy    Date/Time: 1/9/2025 4:32 PM    Performed by: Robbie Koehler MD  Authorized by: Robbie Koehler MD    Procedure Details - Skin Biopsy:     Body area: head/neck    Head/neck location: neck    Initial size (mm): 2    Final defect size (mm): 2    Malignancy: malignancy unknown      Destruction method: shave biopsy      Comments:   Local infiltration with lidocaine performed without complication.  Hemostasis achieved with silver nitrate.  Antibiotic ointment and Band-Aid applied.  Patient counseled regarding hygiene precautions.  Biopsy    Date/Time: 1/9/2025 4:32 PM    Performed by: Robbie Koehler MD  Authorized by: Robbie Koehler MD    Procedure Details - Skin Biopsy:     Body area: trunk    Trunk location: chest    Initial size (mm): 10    Final defect size (mm): 10    Malignancy: malignancy unknown      Destruction method: shave biopsy      Comments:   Local infiltration with lidocaine performed without complication.  Hemostasis achieved with silver nitrate.  Antibiotic ointment and Band-Aid applied.  Patient counseled regarding hygiene precautions.  Skin Tag Removal    Date/Time: 1/9/2025 4:32 PM    Performed by: Robbie Koehler MD  Authorized by: Robbie Koehler MD  Consent: Verbal consent obtained. Written consent obtained.  Risks and benefits: risks, benefits and alternatives were discussed  Consent given by: patient  Patient understanding: patient states understanding of the procedure being performed  Patient consent: the patient's understanding of the procedure matches consent given  Procedure consent: procedure consent matches procedure scheduled  Relevant documents: relevant documents present and verified  Site  "marked: the operative site was marked  Patient identity confirmed: provided demographic data  Time out: Immediately prior to procedure a \"time out\" was called to verify the correct patient, procedure, equipment, support staff and site/side marked as required.  Preparation: Patient was prepped and draped in the usual sterile fashion.  Local anesthesia used: no    Anesthesia:  Local anesthesia used: no    Sedation:  Patient sedated: no    Patient tolerance: patient tolerated the procedure well with no immediate complications          Results:   Results  - Laboratory Studies:    - LDL cholesterol: 121    - HDL cholesterol: at a good level    Labs:   TSH   Date Value Ref Range Status   03/08/2024 1.900 0.270 - 4.200 uIU/mL Final        Imaging:   No valid procedures specified.     Assessment / Plan      Assessment/Plan:   Problem List Items Addressed This Visit       Other emphysema    Overview     PFT in 4/2024: no COPD, mild decrease in DLCO         Mixed hyperlipidemia    Relevant Orders    Lipid Panel    Atrial fibrillation with RVR    Overview     New onset 3/6, after shingles and RSV vaccines         H. pylori infection - Primary    Relevant Orders    H. Pylori Antigen, Stool - Stool, Per Rectum    Transaminitis    Relevant Orders    Comprehensive Metabolic Panel    POC INR     Other Visit Diagnoses       Liver damage        Relevant Orders    POC INR    Skin lesion        Relevant Medications    mupirocin (BACTROBAN) 2 % ointment    Other Relevant Orders    TISSUE EXAM, [facility]    TISSUE EXAM, [facility]    Biopsy    Biopsy    Skin tag        Relevant Medications    mupirocin (BACTROBAN) 2 % ointment    Other Relevant Orders    TISSUE EXAM, [facility]    TISSUE EXAM, [facility]    Skin Tag Removal            Assessment & Plan  1. Unspecified skin lesion  - Likely skin tags, can be removed without injections  - Lesion on anterior chest appears to be seborrheic " keratosis (benign)  - Another lesion likely a benign dermatofibroma  - Shave biopsy to be performed today  - Apply over-the-counter Neosporin 2-3 times daily and keep area clean with warm soapy water  - Prescription for mupirocin ointment provided, to be used 3 times daily if area becomes red despite Neosporin  - Informed that skin may turn black due to silver nitrate application, not a cause for concern    2. Gastroesophageal reflux disease (GERD)  - Off omeprazole since 12/28/2024, has not started famotidine  - Symptoms well-tolerated, will remain off both medications  - H. pylori eradication test scheduled for 01/28/2025  - If infection persists, initiate quadruple therapy including omeprazole    3. Elevated liver enzymes  - Previous tests showed slight elevation, common and often resolves on its own  - Comprehensive metabolic panel to monitor liver enzyme levels  - Advised to abstain from alcohol and avoid liver-harming medications  - Recommended balanced diet and regular exercise  - Further evaluation if liver enzymes remain elevated to determine if issue is functional or structural, potentially requiring imaging or follow-up testing    4. Cholesterol management  - LDL: 121 (slightly abnormal), total cholesterol and HDL well-controlled  - ASCVD risk: 7.7 percent, borderline for statin therapy  - Advised to continue dietary modifications, including plant-based diet  - Additional precautions if cholesterol levels remain high and long-term risk increases  - Goal to manage condition with minimal medication    5. Chronic obstructive pulmonary disease (COPD)  - Previous imaging showed COPD-related changes, no overt symptoms  - Regular check-ins to monitor for symptoms  - No further action regarding inhalers if asymptomatic    6. Atrial fibrillation  - Currently on metoprolol and Eliquis for atrial fibrillation and RVR, and to reduce stroke risk  - Will continue these medications long-term  - Potential switch from  metoprolol tartrate to metoprolol succinate for convenience (once daily dosing)    PROCEDURE  Procedure Performed  Shave biopsy    Follow Up:   Return in about 3 months (around 4/9/2025) for Recheck.        Patient or patient representative verbalized consent for the use of Ambient Listening during the visit with  Robbie Koehler MD for chart documentation. 1/9/2025  16:30 EST    Robbie Koehler MD  [facility]

## 2025-01-13 LAB
REF LAB TEST METHOD: NORMAL

## 2025-01-29 ENCOUNTER — LAB (OUTPATIENT)
Dept: INTERNAL MEDICINE | Facility: CLINIC | Age: 70
End: 2025-01-29
Payer: MEDICARE

## 2025-01-29 DIAGNOSIS — A04.8 H. PYLORI INFECTION: ICD-10-CM

## 2025-01-29 PROCEDURE — 87338 HPYLORI STOOL AG IA: CPT | Performed by: STUDENT IN AN ORGANIZED HEALTH CARE EDUCATION/TRAINING PROGRAM

## 2025-01-31 LAB — H PYLORI AG STL QL IA: NEGATIVE

## 2025-03-06 ENCOUNTER — HOSPITAL ENCOUNTER (OUTPATIENT)
Dept: BONE DENSITY | Facility: HOSPITAL | Age: 70
Discharge: HOME | End: 2025-03-06
Admitting: STUDENT IN AN ORGANIZED HEALTH CARE EDUCATION/TRAINING PROGRAM
Payer: MEDICARE

## 2025-03-06 DIAGNOSIS — Z78.0 POSTMENOPAUSE: ICD-10-CM

## 2025-03-06 PROCEDURE — 77080 DXA BONE DENSITY AXIAL: CPT

## 2025-03-11 PROBLEM — M81.0 AGE-RELATED OSTEOPOROSIS WITHOUT CURRENT PATHOLOGICAL FRACTURE: Status: ACTIVE | Noted: 2025-03-11

## 2025-03-11 RX ORDER — ALENDRONATE SODIUM 70 MG/1
70 TABLET ORAL
Qty: 12 TABLET | Refills: 1 | Status: SHIPPED | OUTPATIENT
Start: 2025-03-11

## 2025-03-31 ENCOUNTER — TELEPHONE (OUTPATIENT)
Dept: CARDIOLOGY | Facility: CLINIC | Age: 70
End: 2025-03-31
Payer: MEDICARE

## 2025-03-31 NOTE — TELEPHONE ENCOUNTER
"  Caller: Kim Watson \"Shanell\"    Relationship: Self    Best call back number: [phone]     What is the best time to reach you: ANYTIME    Who are you requesting to speak with (clinical staff, provider,  specific staff member):  ANYONE      What was the call regarding:  PT IS WANTING TO SEE IF HER ECG READINGS FROM [employer] WAS SENT OVER CORRECTLY. SHE SENT THOSE ON [employer].     Is it okay if the provider responds through Not [employer]:  YES       "

## 2025-03-31 NOTE — TELEPHONE ENCOUNTER
LOUISE  Reports her apple watch shows A-Fib. Feels fine. Denies palpitations and soa. Taking medications as prescribed. Instructed her to download her readings to [employer].Also told her that she can come in for an EKG is she wants.Stated would try and download her readings.

## 2025-04-03 ENCOUNTER — RESULTS FOLLOW-UP (OUTPATIENT)
Dept: CARDIOLOGY | Facility: CLINIC | Age: 70
End: 2025-04-03
Payer: MEDICARE

## 2025-04-03 ENCOUNTER — OFFICE VISIT (OUTPATIENT)
Dept: INTERNAL MEDICINE | Facility: CLINIC | Age: 70
End: 2025-04-03
Payer: MEDICARE

## 2025-04-03 ENCOUNTER — HOSPITAL ENCOUNTER (OUTPATIENT)
Dept: CARDIOLOGY | Facility: HOSPITAL | Age: 70
Discharge: HOME | End: 2025-04-03
Admitting: INTERNAL MEDICINE
Payer: MEDICARE

## 2025-04-03 ENCOUNTER — TELEPHONE (OUTPATIENT)
Dept: INTERNAL MEDICINE | Facility: CLINIC | Age: 70
End: 2025-04-03

## 2025-04-03 VITALS
WEIGHT: 141 LBS | DIASTOLIC BLOOD PRESSURE: 52 MMHG | HEIGHT: 64 IN | SYSTOLIC BLOOD PRESSURE: 126 MMHG | BODY MASS INDEX: 24.07 KG/M2

## 2025-04-03 VITALS
DIASTOLIC BLOOD PRESSURE: 66 MMHG | WEIGHT: 141.8 LBS | TEMPERATURE: 97.1 F | HEART RATE: 74 BPM | OXYGEN SATURATION: 98 % | SYSTOLIC BLOOD PRESSURE: 120 MMHG | RESPIRATION RATE: 18 BRPM | BODY MASS INDEX: 24.51 KG/M2

## 2025-04-03 DIAGNOSIS — Z87.891 PERSONAL HISTORY OF NICOTINE DEPENDENCE: ICD-10-CM

## 2025-04-03 DIAGNOSIS — F32.A ANXIETY AND DEPRESSION: ICD-10-CM

## 2025-04-03 DIAGNOSIS — I48.91 ATRIAL FIBRILLATION WITH RVR: Primary | ICD-10-CM

## 2025-04-03 DIAGNOSIS — Z12.2 ENCOUNTER FOR SCREENING FOR LUNG CANCER: ICD-10-CM

## 2025-04-03 DIAGNOSIS — F41.9 ANXIETY AND DEPRESSION: ICD-10-CM

## 2025-04-03 DIAGNOSIS — I34.0 SEVERE MITRAL REGURGITATION: ICD-10-CM

## 2025-04-03 DIAGNOSIS — M81.0 AGE-RELATED OSTEOPOROSIS WITHOUT CURRENT PATHOLOGICAL FRACTURE: ICD-10-CM

## 2025-04-03 LAB
AORTIC DIMENSIONLESS INDEX: 0.69 (DI)
AV MEAN PRESS GRAD SYS DOP V1V2: 3.2 MMHG
AV VMAX SYS DOP: 120.6 CM/SEC
BH CV ECHO MEAS - AI P1/2T: 522.2 MSEC
BH CV ECHO MEAS - AO MAX PG: 5.8 MMHG
BH CV ECHO MEAS - AO ROOT DIAM: 2.9 CM
BH CV ECHO MEAS - AO V2 VTI: 25.4 CM
BH CV ECHO MEAS - AVA(I,D): 2.17 CM2
BH CV ECHO MEAS - EDV(CUBED): 97.3 ML
BH CV ECHO MEAS - EDV(MOD-SP2): 74.2 ML
BH CV ECHO MEAS - EDV(MOD-SP4): 84.5 ML
BH CV ECHO MEAS - EF(MOD-SP2): 66 %
BH CV ECHO MEAS - EF(MOD-SP4): 62.6 %
BH CV ECHO MEAS - ESV(CUBED): 32.8 ML
BH CV ECHO MEAS - ESV(MOD-SP2): 25.2 ML
BH CV ECHO MEAS - ESV(MOD-SP4): 31.6 ML
BH CV ECHO MEAS - FS: 30.4 %
BH CV ECHO MEAS - IVS/LVPW: 1 CM
BH CV ECHO MEAS - IVSD: 0.9 CM
BH CV ECHO MEAS - LA DIMENSION: 4.1 CM
BH CV ECHO MEAS - LAT PEAK E' VEL: 10.4 CM/SEC
BH CV ECHO MEAS - LV DIASTOLIC VOL/BSA (35-75): 50.7 CM2
BH CV ECHO MEAS - LV MASS(C)D: 137.7 GRAMS
BH CV ECHO MEAS - LV MAX PG: 3.9 MMHG
BH CV ECHO MEAS - LV MEAN PG: 2 MMHG
BH CV ECHO MEAS - LV SYSTOLIC VOL/BSA (12-30): 19 CM2
BH CV ECHO MEAS - LV V1 MAX: 98.3 CM/SEC
BH CV ECHO MEAS - LV V1 VTI: 17.5 CM
BH CV ECHO MEAS - LVIDD: 4.6 CM
BH CV ECHO MEAS - LVIDS: 3.2 CM
BH CV ECHO MEAS - LVOT AREA: 3.1 CM2
BH CV ECHO MEAS - LVOT DIAM: 2 CM
BH CV ECHO MEAS - LVPWD: 0.9 CM
BH CV ECHO MEAS - MED PEAK E' VEL: 10.8 CM/SEC
BH CV ECHO MEAS - MR MAX PG: 77.5 MMHG
BH CV ECHO MEAS - MR MAX VEL: 440 CM/SEC
BH CV ECHO MEAS - MR MEAN PG: 52.5 MMHG
BH CV ECHO MEAS - MR MEAN VEL: 343 CM/SEC
BH CV ECHO MEAS - MR VTI: 148.5 CM
BH CV ECHO MEAS - MV DEC SLOPE: 723 CM/SEC2
BH CV ECHO MEAS - MV DEC TIME: 0.32 SEC
BH CV ECHO MEAS - MV E MAX VEL: 141 CM/SEC
BH CV ECHO MEAS - MV MAX PG: 12.3 MMHG
BH CV ECHO MEAS - MV MEAN PG: 5 MMHG
BH CV ECHO MEAS - MV P1/2T: 81 MSEC
BH CV ECHO MEAS - MV V2 VTI: 38.4 CM
BH CV ECHO MEAS - MVA(P1/2T): 2.7 CM2
BH CV ECHO MEAS - MVA(VTI): 1.43 CM2
BH CV ECHO MEAS - PA ACC TIME: 0.13 SEC
BH CV ECHO MEAS - PA V2 MAX: 90.5 CM/SEC
BH CV ECHO MEAS - PI END-D VEL: 106.4 CM/SEC
BH CV ECHO MEAS - RAP SYSTOLE: 3 MMHG
BH CV ECHO MEAS - RVSP: 37 MMHG
BH CV ECHO MEAS - SV(LVOT): 55 ML
BH CV ECHO MEAS - SV(MOD-SP2): 49 ML
BH CV ECHO MEAS - SV(MOD-SP4): 52.9 ML
BH CV ECHO MEAS - SVI(LVOT): 33 ML/M2
BH CV ECHO MEAS - SVI(MOD-SP2): 29.4 ML/M2
BH CV ECHO MEAS - SVI(MOD-SP4): 31.7 ML/M2
BH CV ECHO MEAS - TAPSE (>1.6): 2.33 CM
BH CV ECHO MEAS - TR MAX PG: 34.1 MMHG
BH CV ECHO MEAS - TR MAX VEL: 292 CM/SEC
BH CV ECHO MEASUREMENTS AVERAGE E/E' RATIO: 13.3
BH CV VAS BP LEFT ARM: NORMAL MMHG
BH CV XLRA - RV BASE: 3.2 CM
BH CV XLRA - RV LENGTH: 5.3 CM
BH CV XLRA - RV MID: 2.2 CM
BH CV XLRA - TDI S': 14.4 CM/SEC
LEFT ATRIUM VOLUME INDEX: 36.6 ML/M2
LV EF BIPLANE MOD: 62 %

## 2025-04-03 PROCEDURE — 93306 TTE W/DOPPLER COMPLETE: CPT

## 2025-04-03 RX ORDER — METOPROLOL TARTRATE 50 MG
50 TABLET ORAL 2 TIMES DAILY
Qty: 180 TABLET | Refills: 1 | Status: SHIPPED | OUTPATIENT
Start: 2025-04-03

## 2025-04-03 NOTE — TELEPHONE ENCOUNTER
Please let the patient know that her cardiologist will reach out about an earlier echo and appointment with him as he thinks that her heart valve is driving her atrial fibrillation symptoms and she can potentially meet with an electrophysiologist after this appointment is completed with him. Thanks.     Pt stated she is set today for Echo at 3:30 and electrophysiologist 4/21.

## 2025-04-03 NOTE — PROGRESS NOTES
Follow Up Office Visit      Date: 2025   Patient Name: Kim Watson  : 1955   MRN: [id number]     Chief Complaint:    Chief Complaint   Patient presents with    Atrial Fibrillation     3 days   Headache   Heart palpitations       Answers submitted by the patient for this visit:  Problem not listed (Submitted on 3/27/2025)  Chief Complaint: Other medical problem  Reason for appointment: Follow up  anorexia: No  joint pain: Yes  change in stool: No  headaches: No  joint swelling: No  vertigo: No  visual change: No      History of Present Illness: Kim Watson is a 69 y.o. female who is here today to follow up with chronic care management.    History of Present Illness  The patient is a 69-year-old female who presents for chronic care management.    Atrial Fibrillation (A-fib)  She reports no current symptoms of atrial fibrillation (A-fib). However, her smartwatch detected an irregular heartbeat on Saturday night at 11:30 PM, which persisted into the following morning. Despite this, she has been feeling well overall, with the exception of a mild headache that has lasted for two days. Her blood pressure has been fluctuating, necessitating daily monitoring. On Monday, she contacted her cardiologist's office and provided them with her watch data, which confirmed the presence of A-fib. She was advised to increase her metoprolol dosage to 50 mg twice daily as long as she remains in A-fib. She reports no chest pain or shortness of breath. Her heart rate has been consistently in the 70s, with occasional spikes into the 90s and once reaching 100. Her daughter has expressed concern about the risk of stroke due to her A-fib. She has not previously consulted with an electrophysiologist or a cardiologist until her A-fib diagnosis on 2025, which led to her hospitalization. She is currently under the care of Dr. Jo, who performed a cardioversion procedure during her emergency room visit. She  is scheduled for an echocardiogram in May 2025 and a follow-up appointment with Dr. Jo in July 2025. She expresses anxiety about her condition, particularly when experiencing minor twinges between her shoulder and body, and is unsure how to recognize if she is experiencing cardiac pain. She also questions whether she should be concerned if her heart rate exceeds 110 and remains elevated, as this was the case when she was admitted to the emergency room. She is curious about the potential impact of valve repair on her condition and has considered the possibility of a pacemaker. She has had a valve problem for over 30 years, which was not a cause for concern until her A-fib diagnosis. She has flown a few times and is wondering if she should be concerned about flying.  - Onset: Detected by smartwatch on Saturday night at 11:30 PM, persisted into the following morning.  - Location: Irregular heartbeat detected.  - Duration: Persisted into the following morning.  - Character: Irregular heartbeat, mild headache, fluctuating blood pressure.  - Alleviating/Aggravating Factors: Increased metoprolol dosage to 50 mg twice daily.  - Timing: Heart rate consistently in the 70s, occasional spikes into the 90s, once reaching 100.  - Severity: No chest pain or shortness of breath, anxiety about condition, concern about stroke risk.    Osteoporosis  She has been taking alendronate for her osteoporosis for a month now.    Mental Health  She reports feeling slightly better in terms of her anxiety and depression but continues to cry daily due to missing her family. She expresses fear about her health and feels isolated, as she does not have anyone to talk to about her concerns. She has attempted counseling but did not find it helpful. She tries to take time each day to relax and avoid stressing over things beyond her control. She prefers to limit her medication intake and finds comfort in talking to her dogs.    SOCIAL HISTORY  She  has already stopped smoking.    FAMILY HISTORY  Her mother had a pacemaker and was in A-fib 100% of the time.    MEDICATIONS  - Current: metoprolol  - Current: Eliquis  - Current: alendronate      Subjective      Review of Systems:   Review of Systems   Constitutional:  Negative for chills, diaphoresis, fatigue and fever.   HENT:  Negative for congestion, sore throat and swollen glands.    Respiratory:  Negative for cough.    Cardiovascular:  Negative for chest pain.   Gastrointestinal:  Negative for abdominal pain, nausea and vomiting.   Genitourinary:  Negative for dysuria.   Musculoskeletal:  Negative for myalgias and neck pain.   Skin:  Negative for rash.   Neurological:  Negative for weakness and numbness.       I have reviewed the patients family history, social history, past medical history, past surgical history and have updated it as appropriate.     Medications:     Current Outpatient Medications:     alendronate (FOSAMAX) 70 MG tablet, Take 1 tablet by mouth Every 7 (Seven) Days., Disp: 12 tablet, Rfl: 1    Eliquis 5 MG tablet tablet, TAKE 1 TABLET BY MOUTH 2 TIMES A DAY, Disp: 60 tablet, Rfl: 5    metoprolol tartrate (LOPRESSOR) 50 MG tablet, Take 1 tablet by mouth 2 (Two) Times a Day., Disp: 180 tablet, Rfl: 1    mupirocin (BACTROBAN) 2 % ointment, Apply 1 Application topically to the appropriate area as directed 3 (Three) Times a Day., Disp: 22 g, Rfl: 0    Allergies:   Allergies   Allergen Reactions    Diltiazem Itching and Rash     Rash along vein of IV; itching    Morphine And Codeine GI Intolerance     SEVERE    Sulfa Antibiotics GI Intolerance     SEVERE       Objective     Physical Exam: Please see above  Vital Signs:   Vitals:    04/03/25 1022   BP: 120/66   BP Location: Right arm   Patient Position: Sitting   Cuff Size: Adult   Pulse: 74   Resp: 18   Temp: 97.1 °F (36.2 °C)   TempSrc: Temporal   SpO2: 98%   Weight: 64.3 kg (141 lb 12.8 oz)   PainSc: 0-No pain     Body mass index is 24.51  kg/m².  BMI is within normal parameters. No other follow-up for BMI required.       Physical Exam  Vitals and nursing note reviewed.   Constitutional:       General: She is not in acute distress.     Appearance: Normal appearance. She is normal weight. She is not ill-appearing or toxic-appearing.   HENT:      Nose: No congestion or rhinorrhea.   Eyes:      General:         Right eye: No discharge.         Left eye: No discharge.      Conjunctiva/sclera: Conjunctivae normal.   Cardiovascular:      Rate and Rhythm: Normal rate and regular rhythm.      Heart sounds: Murmur heard.      No friction rub.   Pulmonary:      Effort: Pulmonary effort is normal. No respiratory distress.      Breath sounds: Normal breath sounds. No wheezing or rhonchi.   Abdominal:      General: Abdomen is flat. Bowel sounds are normal. There is no distension.      Palpations: Abdomen is soft. There is no mass.      Tenderness: There is no abdominal tenderness. There is no guarding or rebound.   Musculoskeletal:      Cervical back: Normal range of motion.      Right lower leg: No edema.      Left lower leg: No edema.   Skin:     Findings: No lesion or rash.   Neurological:      General: No focal deficit present.      Mental Status: She is alert. Mental status is at baseline.      Coordination: Coordination normal.      Gait: Gait normal.   Psychiatric:         Mood and Affect: Mood normal.         Behavior: Behavior normal.         Thought Content: Thought content normal.         Judgment: Judgment normal.           ECG 12 Lead    Date/Time: 4/3/2025 10:40 AM  Performed by: Robbie Koehler MD    Authorized by: Robbie Koehler MD  Comparison: not compared with previous ECG   Rhythm: atrial fibrillation  Rate: normal  Conduction: conduction normal  ST Segments: ST segments normal  T Waves: T waves normal  QRS axis: normal    Clinical impression: abnormal EKG          Results:   Results  - Testing:    - EKG confirms patient is still in  A-fib    Labs:   TSH   Date Value Ref Range Status   03/08/2024 1.900 0.270 - 4.200 uIU/mL Final        Imaging:   No valid procedures specified.     Assessment / Plan      Assessment/Plan:   Problem List Items Addressed This Visit       Anxiety and depression    Atrial fibrillation with RVR - Primary    Overview   New onset 3/6, after shingles and RSV vaccines         Relevant Medications    metoprolol tartrate (LOPRESSOR) 50 MG tablet    Other Relevant Orders    ECG 12 Lead    Ambulatory Referral to Cardiology    Severe mitral regurgitation    Relevant Medications    metoprolol tartrate (LOPRESSOR) 50 MG tablet    Age-related osteoporosis without current pathological fracture    Relevant Medications    alendronate (FOSAMAX) 70 MG tablet     Other Visit Diagnoses         Encounter for screening for lung cancer        Relevant Orders     CT Chest Low Dose Cancer Screening WO      Personal history of nicotine dependence        Relevant Orders     CT Chest Low Dose Cancer Screening WO            Assessment & Plan  1. Atrial Fibrillation (A-fib)  - EKG confirms persistent A-fib without rapid ventricular response (RVR)  - Heart rate stable in the 70s, controlled with metoprolol  - Stroke risk mitigated by Eliquis regimen  - Severe mitral valve regurgitation may contribute to A-fib  - Patient proactive in health management, reducing risk through consultations and medication adherence  - No alarming symptoms or imminent health threats  - Condition under appropriate control, working towards stabilizing A-fib  - Advised to seek immediate medical attention for persistent palpitations, chest pressure, or shortness of breath  - Referral to electrophysiologist for further evaluation and management  - Consult with Dr. Jo regarding potential interventions for mitral valve regurgitation  - Inform patient of additional cardiology recommendations once appointment is scheduled    2. Osteoporosis  - Tolerating alendronate well for  the past month  - Continue alendronate until at least 2030  - Biennial DEXA scans to monitor bone density    3. Anxiety and Depression  - Feeling slightly better but experiencing daily crying episodes due to recent major losses  - Encouraged to reach out if feeling isolated or needing additional resources  - No changes to current medication regimen  - Advise to update if symptoms worsen or if considering medication modifications    4. Health Maintenance  - Due for lung cancer screening, quit smoking detention through screening protocol  - Last screening in 2024 yielded satisfactory results  - Referral for lung cancer screening within the next few months  - Continue biennial mammograms as previously discussed    Follow-up  - Scheduled for follow-up visit in 3 months    Follow Up:   Return in about 3 months (around 7/3/2025) for Recheck.    I spent 40 minutes caring for Kim on this date of service. This time includes time spent by me in the following activities: preparing for the visit, reviewing tests, performing a medically appropriate examination and/or evaluation, counseling and educating the patient/family/caregiver, referring and communicating with other health care professionals, documenting information in the medical record, independently interpreting results and communicating that information with the patient/family/caregiver, care coordination, and ordering medications.     Patient or patient representative verbalized consent for the use of Ambient Listening during the visit with  Robbie Koehler MD for chart documentation. 4/3/2025  12:44 EDT    Robbie Koehler MD  [facility] [place] [facility]

## 2025-04-21 ENCOUNTER — OFFICE VISIT (OUTPATIENT)
Dept: CARDIOLOGY | Facility: CLINIC | Age: 70
End: 2025-04-21
Payer: MEDICARE

## 2025-04-21 VITALS
OXYGEN SATURATION: 96 % | SYSTOLIC BLOOD PRESSURE: 138 MMHG | WEIGHT: 142.4 LBS | BODY MASS INDEX: 24.31 KG/M2 | HEIGHT: 64 IN | DIASTOLIC BLOOD PRESSURE: 76 MMHG | HEART RATE: 64 BPM

## 2025-04-21 DIAGNOSIS — I34.0 NONRHEUMATIC MITRAL VALVE REGURGITATION: ICD-10-CM

## 2025-04-21 DIAGNOSIS — E78.2 MIXED HYPERLIPIDEMIA: Primary | ICD-10-CM

## 2025-04-21 DIAGNOSIS — I48.91 ATRIAL FIBRILLATION WITH RVR: ICD-10-CM

## 2025-04-21 DIAGNOSIS — I05.1 RHEUMATIC MITRAL REGURGITATION: ICD-10-CM

## 2025-04-21 PROCEDURE — G2211 COMPLEX E/M VISIT ADD ON: HCPCS | Performed by: INTERNAL MEDICINE

## 2025-04-21 PROCEDURE — 99214 OFFICE O/P EST MOD 30 MIN: CPT | Performed by: INTERNAL MEDICINE

## 2025-04-21 NOTE — PROGRESS NOTES
[facility] Cardiology  Office visit  Kim Watson  1955  [phone]  There is no work phone number on file.    VISIT DATE:  4/21/2025    PCP: Robbie Koehler MD  [address]  [facility] [id number]    CC:  Chief Complaint   Patient presents with    Severe mitral regurgitation       Previous cardiac studies and procedures:  March 2024 SENA with successful cardioversion    Left ventricular systolic function is low normal. Left ventricular ejection fraction appears to be 51 - 55%.    The left atrial cavity is moderately dilated.    The right atrial cavity is mildly  dilated.    Moderate to severe mitral valve regurgitation is present.     April 2024 Holter, 48-hour   Frequent PVCs, 6%.     May 2024 TTE    Left ventricular ejection fraction appears to be 66 - 70%.    Moderately reduced right ventricular systolic function noted.    The left atrial cavity is moderate to severely dilated.    Moderate mitral valve regurgitation is present.    Estimated right ventricular systolic pressure from tricuspid regurgitation is normal (<35 mmHg).    April 2025 TTE    Left ventricular systolic function is normal. Calculated left ventricular EF = 62% Left ventricular ejection fraction appears to be 61 - 65%.    Left ventricular diastolic function was indeterminate.    The left atrial cavity is mildly dilated.    There is mild, bileaflet mitral valve thickening present.    Moderate mitral valve regurgitation is present.    Mild mitral valve stenosis is present. The mitral valve peak gradient is 12 mmHg. The mitral valve mean gradient is 5 mmHg. The mitral valve area (PHT) is 2.7 cm2.    Estimated right ventricular systolic pressure from tricuspid regurgitation is mildly elevated (35-45 mmHg). Calculated right ventricular systolic pressure from tricuspid regurgitation is 37 mmHg.    ASSESSMENT:   Diagnosis Plan   1. Mixed hyperlipidemia        2. Atrial fibrillation with RVR    "     3. Nonrheumatic mitral valve regurgitation        4. Rheumatic mitral regurgitation            PLAN:  Persistent atrial fibrillation: Maintaining sinus rhythm.  Chads Vasc equal to 2.  Continue Eliquis 5 mg p.o. twice daily, will transition to warfarin if she develops worsening mitral stenosis.  Continue beta-blockade for rate control, continue metoprolol to 25 mg p.o. twice daily.  Increase metoprolol to tartrate to 50 mg p.o. twice daily during episodes of A-fib.    Mitral insufficiency: Rheumatic.  Moderate insufficiency, associated mild mitral stenosis.  Questionable history of scarlet fever as a child, valve morphology consistent with rheumatic heart disease.  Continue annual echocardiographic evaluation.  Associated left atrial enlargement and paroxysmal atrial fibrillation.  Normal LV function and chamber dimensions.  No evidence of pulmonary pretension.  Not recommending surgical or percutaneous valve intervention at this time.    PVCs: Asymptomatic.  Continue beta-blockade.      Subjective  Denies chest pain, palpitations or dyspnea on exertion.  Blood pressures running less than 130/80 mmHg.  She is compliant with medical therapy.  She has had 1 week of asymptomatic paroxysmal atrial fibrillation, documented by her Apple Watch.    PHYSICAL EXAMINATION:  Vitals:    04/21/25 0918   BP: 138/76   BP Location: Right arm   Patient Position: Sitting   Cuff Size: Adult   Pulse: 64   SpO2: 96%   Weight: 64.6 kg (142 lb 6.4 oz)   Height: 162 cm (63.78\")     General Appearance:    Alert, cooperative, no distress, appears stated age   Head:    Normocephalic, without obvious abnormality, atraumatic   Eyes:    conjunctiva/corneas clear   Nose:   Nares normal, septum midline, mucosa normal, no drainage   Throat:   Lips, teeth and gums normal   Neck:   Supple, symmetrical, trachea midline, no carotid    bruit or JVD   Lungs:     Clear to auscultation bilaterally, respirations unlabored   Chest Wall:    No tenderness " "or deformity    Heart:  Bradycardia, S1 and S2 normal, no murmur, rub   or gallop, normal carotid impulse bilaterally without bruit.   Abdomen:     Soft, non-tender   Extremities:   Extremities normal, atraumatic, no cyanosis or edema   Pulses:   2+ and symmetric all extremities   Skin:   Skin color, texture, turgor normal, no rashes or lesions       Diagnostic Data:  Procedures  Lab Results   Component Value Date    TRIG 144 12/10/2024    HDL 55 12/10/2024     Lab Results   Component Value Date    GLUCOSE 89 12/10/2024    BUN 12 12/10/2024    CREATININE 0.83 12/10/2024     12/10/2024    K 4.3 12/10/2024     12/10/2024    CO2 27.0 12/10/2024     No results found for: \"HGBA1C\"  Lab Results   Component Value Date    WBC 6.26 12/10/2024    HGB 14.6 12/10/2024    HCT 43.5 12/10/2024     12/10/2024       Allergies  Allergies   Allergen Reactions    Diltiazem Itching and Rash     Rash along vein of IV; itching    Morphine And Codeine GI Intolerance     SEVERE    Sulfa Antibiotics GI Intolerance     SEVERE       Current Medications    Current Outpatient Medications:     alendronate (FOSAMAX) 70 MG tablet, Take 1 tablet by mouth Every 7 (Seven) Days., Disp: 12 tablet, Rfl: 1    Eliquis 5 MG tablet tablet, TAKE 1 TABLET BY MOUTH 2 TIMES A DAY, Disp: 60 tablet, Rfl: 5    metoprolol tartrate (LOPRESSOR) 50 MG tablet, Take 1 tablet by mouth 2 (Two) Times a Day. (Patient taking differently: Take 0.5 tablets by mouth 2 (Two) Times a Day.), Disp: 180 tablet, Rfl: 1    mupirocin (BACTROBAN) 2 % ointment, Apply 1 Application topically to the appropriate area as directed 3 (Three) Times a Day., Disp: 22 g, Rfl: 0          ROS  ROS      SOCIAL HX  Social History     Socioeconomic History    Marital status:    Tobacco Use    Smoking status: Former     Current packs/day: 0.00     Average packs/day: 1.5 packs/day for 44.8 years (67.2 ttl pk-yrs)     Types: Cigarettes, Electronic Cigarette     Start date: " 1974     Quit date: 10/15/2018     Years since quittin.5    Smokeless tobacco: Never   Vaping Use    Vaping status: Former   Substance and Sexual Activity    Alcohol use: Not Currently     Comment: 1 or 2 ready to drink margaritas 5 or 6 times a week    Drug use: No    Sexual activity: Not Currently     Partners: Male       FAMILY HX  Family History   Problem Relation Age of Onset    Arrhythmia Mother         pacemaker    Arthritis Mother     Migraines Mother     COPD Mother     Asthma Mother     Fainting Mother         Mini strokes    Arrhythmia Father         pacemaker    Cancer Father         Bladder cancer    Alzheimer's disease Father     Diabetes Sister     Diabetes Brother     Migraines Brother     Alcohol abuse Brother         Alcoholic    No Known Problems Brother     No Known Problems Brother     No Known Problems Maternal Grandmother     Lung disease Paternal Grandmother     Alzheimer's disease Maternal Grandfather     No Known Problems Paternal Grandfather     Breast cancer Neg Hx              Jimbo Jo III, MD, FACC

## 2025-04-25 ENCOUNTER — HOSPITAL ENCOUNTER (OUTPATIENT)
Dept: CT IMAGING | Facility: HOSPITAL | Age: 70
Discharge: HOME | End: 2025-04-25
Payer: MEDICARE

## 2025-04-25 DIAGNOSIS — Z87.891 PERSONAL HISTORY OF NICOTINE DEPENDENCE: ICD-10-CM

## 2025-04-25 DIAGNOSIS — Z12.2 ENCOUNTER FOR SCREENING FOR LUNG CANCER: ICD-10-CM

## 2025-04-25 PROCEDURE — 71271 CT THORAX LUNG CANCER SCR C-: CPT

## 2025-05-19 PROBLEM — I49.3 PVC'S (PREMATURE VENTRICULAR CONTRACTIONS): Status: ACTIVE | Noted: 2025-05-19

## 2025-05-19 NOTE — PROGRESS NOTES
Electrophysiology Clinic Consult     Kim Watson  [id number]  1955    Referring Provider: Jimbo Jo III, MD   PCP: Robbie Koehler MD  [address] [id number]    Chief Complaint   Patient presents with    Severe mitral regurgitation    Atrial fibrillation with RVR     Problem List  Persistent atrial fibrillation  [id number] 2 (age, female), currently anticoagulated with Eliquis  PVCs  Monitor, 3/11/24: Abnormal monitor study, frequent PVCs 6% burden  Rheumatic Mitral insufficiency  Echo, 5/29/2024: EF 66 to 70%, moderately reduced right ventricular systolic function, left atrial cavity moderate to severely dilated, moderate MR  Echo, 4/3/25: EF 61 to 65%, left atrial cavity mildly dilated, mild, bileaflet mitral valve thickening, moderate MR, mild MS  COPD  Migraines      History of Present Illness  Kim Watson is a 69 y.o. female who presents to my electrophysiology clinic for evaluation of Persistent atrial fibrillation. Diagnosed with AF in 3/2024. Recurrence in 4/2025- unclear trigger. Hx of DCCV after first episode. Recurrent episode self terminated. AF symptoms include SOB and fatigue on exertion.     Review of Systems   Constitutional:  Positive for fever. Negative for activity change and fatigue.   Respiratory:  Positive for shortness of breath. Negative for chest tightness.    Cardiovascular:  Negative for chest pain, palpitations and leg swelling.   Gastrointestinal:  Negative for constipation and diarrhea.   Genitourinary:  Negative for decreased urine volume and difficulty urinating.   Skin:  Negative for wound.   Neurological:  Positive for weakness. Negative for dizziness, syncope and light-headedness.   Psychiatric/Behavioral:  Negative for suicidal ideas.        Outpatient Medications Marked as Taking for the 5/22/25 encounter (Office Visit) with Jimbo Urbina MD   Medication Sig Dispense Refill    alendronate (FOSAMAX) 70 MG tablet Take 1 tablet  "by mouth Every 7 (Seven) Days. 12 tablet 1    Eliquis 5 MG tablet tablet TAKE 1 TABLET BY MOUTH 2 TIMES A DAY 60 tablet 5    metoprolol tartrate (LOPRESSOR) 50 MG tablet Take 1 tablet by mouth 2 (Two) Times a Day. (Patient taking differently: Take 0.5 tablets by mouth 2 (Two) Times a Day.) 180 tablet 1    mupirocin (BACTROBAN) 2 % ointment Apply 1 Application topically to the appropriate area as directed 3 (Three) Times a Day. 22 g 0       Physical Exam  Vitals:    05/22/25 1303   BP: 136/78   BP Location: Left arm   Patient Position: Sitting   Cuff Size: Adult   Pulse: 51   SpO2: 96%   Weight: 64.6 kg (142 lb 6.4 oz)   Height: 162.6 cm (64\")     Body mass index is 24.44 kg/m².    Vitals and nursing note reviewed.   Constitutional:       Appearance: Healthy appearance.   HENT:      Head: Normocephalic and atraumatic.      Nose: Nose normal.   Neck:      Vascular: No JVD.   Pulmonary:      Effort: Pulmonary effort is normal.      Breath sounds: Normal breath sounds.   Cardiovascular:      PMI at left midclavicular line. Normal rate. Regular rhythm. Normal S1. Normal S2.       Murmurs: There is no murmur.      No gallop.    Edema:     Peripheral edema absent.   Skin:     General: Skin is warm and dry.   Neurological:      Mental Status: Oriented to person, place and time.   Psychiatric:         Behavior: Behavior normal.          Diagnostic Data  Procedures    SB 51bpm with PAC   QRS 90 QTc 405    Lab Results   Component Value Date    GLUCOSE 89 12/10/2024    CALCIUM 10.0 12/10/2024     12/10/2024    K 4.3 12/10/2024    CO2 27.0 12/10/2024     12/10/2024    BUN 12 12/10/2024    CREATININE 0.83 12/10/2024    EGFRIFNONA 86 11/29/2021    BCR 14.5 12/10/2024    ANIONGAP 10.0 12/10/2024     Lab Results   Component Value Date    WBC 6.26 12/10/2024    HGB 14.6 12/10/2024    HCT 43.5 12/10/2024    MCV 95.2 12/10/2024     12/10/2024     Lab Results   Component Value Date    INR 0.98 03/08/2024    " PROTIME 13.1 03/08/2024     Lab Results   Component Value Date    TSH 1.900 03/08/2024         I personally viewed and interpreted the patient's EKG/Telemetry/lab data    Kim Watson  reports that she quit smoking about 6 years ago. Her smoking use included cigarettes and electronic cigarette. She started smoking about 51 years ago. She has a 67.2 pack-year smoking history. She has never used smokeless tobacco. I have educated her on the risk of diseases from using tobacco products such as cancer, COPD, and heart disease.       I spent 3  minutes counseling the patient.           ACP discussion was declined by the patient. Patient does not have an advance directive, declines further assistance.    Assessment and Plan   Diagnoses and all orders for this visit:    1. Atrial fibrillation with RVR (Primary)    2. PVC's (premature ventricular contractions)    3. Rheumatic mitral regurgitation        Persistent atrial fibrillation  -[id number] 2 (age, female), currently anticoagulated with Eliquis  -rate controlled with metoprolol  -overall asymptomatic  -After extensive conversation regarding risks, benefits, or alternatives to the therapy (patient voiced understanding of risks, benefits, and alternative), patient elected to defer any further management of AF at this time other than rate control strategy at this time  - follow up in 1 year.      PVCs  -Monitor, 3/11/24: Abnormal monitor study, frequent PVCs 6% burden    Rheumatic Mitral insufficiency  -Echo, 4/3/25: EF 61 to 65%, left atrial cavity mildly dilated, mild, bileaflet mitral valve thickening, moderate MR, mild MS  Follows with Dr. Jo    Follow Up  No follow-ups on file.      Thank you for allowing me to participate in the care of your patient. Please to not hesitate to contact me with additional questions or concerns.

## 2025-05-22 ENCOUNTER — OFFICE VISIT (OUTPATIENT)
Dept: CARDIOLOGY | Facility: CLINIC | Age: 70
End: 2025-05-22
Payer: MEDICARE

## 2025-05-22 VITALS
HEART RATE: 51 BPM | DIASTOLIC BLOOD PRESSURE: 78 MMHG | BODY MASS INDEX: 24.31 KG/M2 | WEIGHT: 142.4 LBS | SYSTOLIC BLOOD PRESSURE: 136 MMHG | OXYGEN SATURATION: 96 % | HEIGHT: 64 IN

## 2025-05-22 DIAGNOSIS — I05.1 RHEUMATIC MITRAL REGURGITATION: Chronic | ICD-10-CM

## 2025-05-22 DIAGNOSIS — I48.91 ATRIAL FIBRILLATION WITH RVR: Primary | Chronic | ICD-10-CM

## 2025-05-22 DIAGNOSIS — I49.3 PVC'S (PREMATURE VENTRICULAR CONTRACTIONS): Chronic | ICD-10-CM

## 2025-06-25 RX ORDER — APIXABAN 5 MG/1
5 TABLET, FILM COATED ORAL 2 TIMES DAILY
Qty: 60 TABLET | Refills: 5 | Status: SHIPPED | OUTPATIENT
Start: 2025-06-25

## 2025-07-15 ENCOUNTER — OFFICE VISIT (OUTPATIENT)
Dept: INTERNAL MEDICINE | Facility: CLINIC | Age: 70
End: 2025-07-15
Payer: MEDICARE

## 2025-07-15 VITALS
HEART RATE: 74 BPM | TEMPERATURE: 97.7 F | SYSTOLIC BLOOD PRESSURE: 128 MMHG | WEIGHT: 148.2 LBS | DIASTOLIC BLOOD PRESSURE: 72 MMHG | RESPIRATION RATE: 18 BRPM | BODY MASS INDEX: 25.44 KG/M2

## 2025-07-15 DIAGNOSIS — E78.2 MIXED HYPERLIPIDEMIA: ICD-10-CM

## 2025-07-15 DIAGNOSIS — F32.A ANXIETY AND DEPRESSION: ICD-10-CM

## 2025-07-15 DIAGNOSIS — R74.01 TRANSAMINITIS: ICD-10-CM

## 2025-07-15 DIAGNOSIS — K76.9 LIVER DAMAGE: ICD-10-CM

## 2025-07-15 DIAGNOSIS — M81.0 AGE-RELATED OSTEOPOROSIS WITHOUT CURRENT PATHOLOGICAL FRACTURE: ICD-10-CM

## 2025-07-15 DIAGNOSIS — I48.91 ATRIAL FIBRILLATION WITH RVR: Primary | ICD-10-CM

## 2025-07-15 DIAGNOSIS — I05.1 RHEUMATIC MITRAL REGURGITATION: ICD-10-CM

## 2025-07-15 DIAGNOSIS — J43.8 OTHER EMPHYSEMA: ICD-10-CM

## 2025-07-15 DIAGNOSIS — F41.9 ANXIETY AND DEPRESSION: ICD-10-CM

## 2025-07-15 PROBLEM — A04.8 H. PYLORI INFECTION: Status: RESOLVED | Noted: 2025-01-09 | Resolved: 2025-07-15

## 2025-07-15 PROBLEM — M85.89 OSTEOPENIA OF MULTIPLE SITES: Status: RESOLVED | Noted: 2023-02-07 | Resolved: 2025-07-15

## 2025-07-15 LAB
ALBUMIN SERPL-MCNC: 4.3 G/DL (ref 3.5–5.2)
ALBUMIN/GLOB SERPL: 1.5 G/DL
ALP SERPL-CCNC: 78 U/L (ref 39–117)
ALT SERPL W P-5'-P-CCNC: 18 U/L (ref 1–33)
ANION GAP SERPL CALCULATED.3IONS-SCNC: 8.1 MMOL/L (ref 5–15)
AST SERPL-CCNC: 24 U/L (ref 1–32)
BILIRUB SERPL-MCNC: 0.2 MG/DL (ref 0–1.2)
BUN SERPL-MCNC: 15 MG/DL (ref 8–23)
BUN/CREAT SERPL: 20.3 (ref 7–25)
CALCIUM SPEC-SCNC: 9.5 MG/DL (ref 8.6–10.5)
CHLORIDE SERPL-SCNC: 106 MMOL/L (ref 98–107)
CHOLEST SERPL-MCNC: 209 MG/DL (ref 0–200)
CO2 SERPL-SCNC: 24.9 MMOL/L (ref 22–29)
CREAT SERPL-MCNC: 0.74 MG/DL (ref 0.57–1)
EGFRCR SERPLBLD CKD-EPI 2021: 88.8 ML/MIN/1.73
GLOBULIN UR ELPH-MCNC: 2.9 GM/DL
GLUCOSE SERPL-MCNC: 85 MG/DL (ref 65–99)
HDLC SERPL-MCNC: 70 MG/DL (ref 40–60)
INR PPP: 1.1 (ref 0.9–1.1)
LDLC SERPL CALC-MCNC: 128 MG/DL (ref 0–100)
LDLC/HDLC SERPL: 1.81 {RATIO}
POTASSIUM SERPL-SCNC: 4.4 MMOL/L (ref 3.5–5.2)
PROT SERPL-MCNC: 7.2 G/DL (ref 6–8.5)
SODIUM SERPL-SCNC: 139 MMOL/L (ref 136–145)
TRIGL SERPL-MCNC: 61 MG/DL (ref 0–150)
VLDLC SERPL-MCNC: 11 MG/DL (ref 5–40)

## 2025-07-15 PROCEDURE — 85610 PROTHROMBIN TIME: CPT | Performed by: STUDENT IN AN ORGANIZED HEALTH CARE EDUCATION/TRAINING PROGRAM

## 2025-07-15 PROCEDURE — 1126F AMNT PAIN NOTED NONE PRSNT: CPT | Performed by: STUDENT IN AN ORGANIZED HEALTH CARE EDUCATION/TRAINING PROGRAM

## 2025-07-15 PROCEDURE — 80061 LIPID PANEL: CPT | Performed by: STUDENT IN AN ORGANIZED HEALTH CARE EDUCATION/TRAINING PROGRAM

## 2025-07-15 PROCEDURE — 80053 COMPREHEN METABOLIC PANEL: CPT | Performed by: STUDENT IN AN ORGANIZED HEALTH CARE EDUCATION/TRAINING PROGRAM

## 2025-07-15 PROCEDURE — 36416 COLLJ CAPILLARY BLOOD SPEC: CPT | Performed by: STUDENT IN AN ORGANIZED HEALTH CARE EDUCATION/TRAINING PROGRAM

## 2025-07-15 PROCEDURE — 99214 OFFICE O/P EST MOD 30 MIN: CPT | Performed by: STUDENT IN AN ORGANIZED HEALTH CARE EDUCATION/TRAINING PROGRAM

## 2025-07-15 RX ORDER — METOPROLOL TARTRATE 25 MG/1
25 TABLET, FILM COATED ORAL 2 TIMES DAILY
Qty: 180 TABLET | Refills: 3 | Status: SHIPPED | OUTPATIENT
Start: 2025-07-15
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
## 2025-08-12 ENCOUNTER — PATIENT MESSAGE (OUTPATIENT)
Dept: INTERNAL MEDICINE | Facility: CLINIC | Age: 70
End: 2025-08-12
Payer: MEDICARE

## 2025-08-12 DIAGNOSIS — I48.91 ATRIAL FIBRILLATION WITH RVR: Primary | ICD-10-CM

## 2025-08-18 ENCOUNTER — HOSPITAL ENCOUNTER (OUTPATIENT)
Dept: CARDIOLOGY | Facility: HOSPITAL | Age: 70
Discharge: HOME | End: 2025-08-18
Admitting: NURSE PRACTITIONER
Payer: MEDICARE

## 2025-08-18 ENCOUNTER — TELEPHONE (OUTPATIENT)
Dept: CARDIOLOGY | Facility: HOSPITAL | Age: 70
End: 2025-08-18
Payer: MEDICARE

## 2025-08-18 ENCOUNTER — OFFICE VISIT (OUTPATIENT)
Dept: CARDIOLOGY | Facility: HOSPITAL | Age: 70
End: 2025-08-18
Payer: MEDICARE

## 2025-08-18 VITALS
HEIGHT: 64 IN | WEIGHT: 143.4 LBS | DIASTOLIC BLOOD PRESSURE: 69 MMHG | OXYGEN SATURATION: 98 % | RESPIRATION RATE: 18 BRPM | BODY MASS INDEX: 24.48 KG/M2 | HEART RATE: 64 BPM | SYSTOLIC BLOOD PRESSURE: 146 MMHG

## 2025-08-18 DIAGNOSIS — I48.0 PAROXYSMAL ATRIAL FIBRILLATION: Primary | ICD-10-CM

## 2025-08-18 DIAGNOSIS — I48.0 PAROXYSMAL ATRIAL FIBRILLATION: ICD-10-CM

## 2025-08-18 DIAGNOSIS — R29.818 SUSPECTED SLEEP APNEA: ICD-10-CM

## 2025-08-18 PROCEDURE — 93246 EXT ECG>7D<15D RECORDING: CPT

## 2025-08-18 PROCEDURE — 1159F MED LIST DOCD IN RCRD: CPT | Performed by: NURSE PRACTITIONER

## 2025-08-18 PROCEDURE — 99215 OFFICE O/P EST HI 40 MIN: CPT | Performed by: NURSE PRACTITIONER

## 2025-08-18 PROCEDURE — 1160F RVW MEDS BY RX/DR IN RCRD: CPT | Performed by: NURSE PRACTITIONER

## 2025-08-22 DIAGNOSIS — M81.0 AGE-RELATED OSTEOPOROSIS WITHOUT CURRENT PATHOLOGICAL FRACTURE: ICD-10-CM

## 2025-08-22 RX ORDER — ALENDRONATE SODIUM 70 MG/1
70 TABLET ORAL WEEKLY
Qty: 12 TABLET | Refills: 1 | Status: SHIPPED | OUTPATIENT
Start: 2025-08-22

## 2025-08-26 ENCOUNTER — TELEPHONE (OUTPATIENT)
Dept: INTERNAL MEDICINE | Facility: CLINIC | Age: 70
End: 2025-08-26

## 2025-08-26 ENCOUNTER — OFFICE VISIT (OUTPATIENT)
Dept: INTERNAL MEDICINE | Facility: CLINIC | Age: 70
End: 2025-08-26
Payer: MEDICARE

## 2025-08-26 VITALS
SYSTOLIC BLOOD PRESSURE: 136 MMHG | TEMPERATURE: 97.8 F | BODY MASS INDEX: 24.37 KG/M2 | DIASTOLIC BLOOD PRESSURE: 64 MMHG | WEIGHT: 142 LBS | OXYGEN SATURATION: 98 % | RESPIRATION RATE: 18 BRPM | HEART RATE: 70 BPM

## 2025-08-26 DIAGNOSIS — R63.1 POLYDIPSIA: ICD-10-CM

## 2025-08-26 DIAGNOSIS — I48.91 ATRIAL FIBRILLATION WITH RVR: Primary | ICD-10-CM

## 2025-08-26 DIAGNOSIS — J43.8 OTHER EMPHYSEMA: ICD-10-CM

## 2025-08-26 DIAGNOSIS — G47.33 OSA (OBSTRUCTIVE SLEEP APNEA): ICD-10-CM

## 2025-08-26 PROCEDURE — G2211 COMPLEX E/M VISIT ADD ON: HCPCS | Performed by: STUDENT IN AN ORGANIZED HEALTH CARE EDUCATION/TRAINING PROGRAM

## 2025-08-26 PROCEDURE — 1159F MED LIST DOCD IN RCRD: CPT | Performed by: STUDENT IN AN ORGANIZED HEALTH CARE EDUCATION/TRAINING PROGRAM

## 2025-08-26 PROCEDURE — 99214 OFFICE O/P EST MOD 30 MIN: CPT | Performed by: STUDENT IN AN ORGANIZED HEALTH CARE EDUCATION/TRAINING PROGRAM

## 2025-08-26 PROCEDURE — 1160F RVW MEDS BY RX/DR IN RCRD: CPT | Performed by: STUDENT IN AN ORGANIZED HEALTH CARE EDUCATION/TRAINING PROGRAM

## 2025-08-26 PROCEDURE — 1126F AMNT PAIN NOTED NONE PRSNT: CPT | Performed by: STUDENT IN AN ORGANIZED HEALTH CARE EDUCATION/TRAINING PROGRAM

## 2025-08-26 RX ORDER — METOPROLOL TARTRATE 25 MG/1
25 TABLET, FILM COATED ORAL 2 TIMES DAILY
Qty: 180 TABLET | Refills: 3 | Status: SHIPPED | OUTPATIENT
Start: 2025-08-26